# Patient Record
Sex: FEMALE | Race: AMERICAN INDIAN OR ALASKA NATIVE | NOT HISPANIC OR LATINO | Employment: OTHER | URBAN - METROPOLITAN AREA
[De-identification: names, ages, dates, MRNs, and addresses within clinical notes are randomized per-mention and may not be internally consistent; named-entity substitution may affect disease eponyms.]

---

## 2017-03-08 ENCOUNTER — OFFICE VISIT (OUTPATIENT)
Dept: OPTOMETRY | Facility: CLINIC | Age: 27
End: 2017-03-08
Payer: COMMERCIAL

## 2017-03-08 DIAGNOSIS — Z46.0 FITTING AND ADJUSTMENT OF SPECTACLES AND CONTACT LENSES: Primary | ICD-10-CM

## 2017-03-08 DIAGNOSIS — H52.13 HIGH MYOPIA, BILATERAL: Primary | ICD-10-CM

## 2017-03-08 PROCEDURE — 99999 PR PBB SHADOW E&M-EST. PATIENT-LVL I: CPT | Mod: PBBFAC,,, | Performed by: OPTOMETRIST

## 2017-03-08 PROCEDURE — 92015 DETERMINE REFRACTIVE STATE: CPT | Mod: S$GLB,,, | Performed by: OPTOMETRIST

## 2017-03-08 PROCEDURE — 99999 PR PBB SHADOW E&M-EST. PATIENT-LVL II: CPT | Mod: PBBFAC,,, | Performed by: OPTOMETRIST

## 2017-03-08 PROCEDURE — 92004 COMPRE OPH EXAM NEW PT 1/>: CPT | Mod: S$GLB,,, | Performed by: OPTOMETRIST

## 2017-03-08 PROCEDURE — 92310 CONTACT LENS FITTING OU: CPT | Mod: S$GLB,,, | Performed by: OPTOMETRIST

## 2017-03-08 NOTE — PROGRESS NOTES
HPI     ROSE MARY: Dec 2015  Pt states distance va has decreased with CL. Denies f/f    No gtts        Last edited by Daisha Stark on 3/8/2017  7:51 AM.     ROS     Negative for: Constitutional, Gastrointestinal, Neurological, Skin,   Genitourinary, Musculoskeletal, HENT, Endocrine, Cardiovascular, Eyes,   Respiratory, Psychiatric, Allergic/Imm, Heme/Lymph    Last edited by Favio Hernandez, OD on 3/8/2017  8:12 AM. (History)        Assessment /Plan     For exam results, see Encounter Report.    High myopia, bilateral      1. High myopia OS>OD--discussed inc risk of RD, S+S.  Advised yearly DFE  2. Wearing BIOFINITY OU (TORIC OS).  Good fit, but now needs TORIC OU (pt not sure of power has been wearing OS)    PLAN:    1. Sent pt to optical to get TRIALS BIOFINITY TORIC in power she needs.  i do NOT need to see at disp  2. Continue Daily Wear schedule.  NO SLEEPING IN CONTACT LENSES. Clean and disinfect nightly.  exchange monthly.    3. If problems w new guidry will rtc for CLFU for overrefraction, otherwise if no problems will call for CLRx, rtc 1 yr

## 2017-03-27 ENCOUNTER — OFFICE VISIT (OUTPATIENT)
Dept: OBSTETRICS AND GYNECOLOGY | Facility: CLINIC | Age: 27
End: 2017-03-27
Payer: COMMERCIAL

## 2017-03-27 VITALS
BODY MASS INDEX: 53.92 KG/M2 | DIASTOLIC BLOOD PRESSURE: 78 MMHG | SYSTOLIC BLOOD PRESSURE: 126 MMHG | HEIGHT: 62 IN | WEIGHT: 293 LBS

## 2017-03-27 DIAGNOSIS — N92.6 IRREGULAR PERIODS/MENSTRUAL CYCLES: Primary | ICD-10-CM

## 2017-03-27 DIAGNOSIS — R73.02 GLUCOSE INTOLERANCE (IMPAIRED GLUCOSE TOLERANCE): ICD-10-CM

## 2017-03-27 DIAGNOSIS — Z31.69 PROCREATIVE MANAGEMENT COUNSELING: ICD-10-CM

## 2017-03-27 PROCEDURE — 1160F RVW MEDS BY RX/DR IN RCRD: CPT | Mod: S$GLB,,, | Performed by: OBSTETRICS & GYNECOLOGY

## 2017-03-27 PROCEDURE — 99999 PR PBB SHADOW E&M-EST. PATIENT-LVL III: CPT | Mod: PBBFAC,,, | Performed by: OBSTETRICS & GYNECOLOGY

## 2017-03-27 PROCEDURE — 99214 OFFICE O/P EST MOD 30 MIN: CPT | Mod: S$GLB,,, | Performed by: OBSTETRICS & GYNECOLOGY

## 2017-03-27 NOTE — MR AVS SNAPSHOT
"    [facility]  [address]  [place] [address]  Phone: [phone]  Fax: [phone]                  Heidi Zhang Caro   3/27/2017 2:45 PM   Office Visit    Description:  Female : 1990   Provider:  Doni Londono MD   Department:  [facility]           Reason for Visit     New pt.                To Do List           Future Appointments        Provider Department Dept Phone    2017 2:15 PM Doni Londono MD [facility] [phone]      Goals (5 Years of Data)     None      [facility]     [facility] -  Assistance  Registered nurses in the [facility] provide clinical advisement, health education, appointment booking, and other advisory services.  Call for this free service at [phone].             Medications           Message regarding Medications     Verify the changes and/or additions to your medication regime listed below are the same as discussed with your clinician today.  If any of these changes or additions are incorrect, please notify your healthcare provider.        STOP taking these medications     cephALEXin (KEFLEX) 500 MG capsule TK ONE C PO TID           Verify that the below list of medications is an accurate representation of the medications you are currently taking.  If none reported, the list may be blank. If incorrect, please contact your healthcare provider. Carry this list with you in case of emergency.           Current Medications     omeprazole (PRILOSEC) 40 MG capsule Take 1 capsule (40 mg total) by mouth 2 (two) times daily before meals.    sucralfate (CARAFATE) 1 gram tablet Take 1 tablet (1 g total) by mouth 4 (four) times daily.           Clinical Reference Information           Your Vitals Were     BP Height Weight Last Period BMI    126/78 5' 2" (1.575 m) 134 kg (295 lb 6.7 oz) 2017 54.03 kg/m2      Blood Pressure          Most Recent Value    BP  126/78    "   Allergies as of 3/27/2017     No Known Allergies      Immunizations Administered on Date of Encounter - 3/27/2017     None      Language Assistance Services     ATTENTION: Language assistance services are available, free of charge. Please call [phone].      ATENCIÓN: Si carlito anglin, tiene a gann disposición servicios gratuitos de asistencia lingüística. Llame al [phone].     CHÚ Ý: N?u b?n nói Ti?ng Vi?t, có các d?ch v? h? tr? ngôn ng? mi?n phí dành cho b?n. G?i s? [phone].         [facility] complies with applicable Federal civil rights laws and does not discriminate on the basis of race, color, national origin, age, disability, or sex.

## 2017-03-31 RX ORDER — METFORMIN HYDROCHLORIDE 500 MG/1
500 TABLET, EXTENDED RELEASE ORAL NIGHTLY
Qty: 30 TABLET | Refills: 11 | Status: SHIPPED | OUTPATIENT
Start: 2017-03-31 | End: 2017-06-07 | Stop reason: ALTCHOICE

## 2017-03-31 NOTE — PROGRESS NOTES
Subjective:       Patient ID: Heidi Zhang Caro is a 26 y.o. female.    Chief Complaint:  New pt. (Establish care & discuss conception; has long cycles)      History of Present Illness  26-year-old  0 presents with trying to conceive for the past 5 months.  She seen a natural family planning instructor with the [employer].  Was told by her previous doctor that her PCO S labs were normal but her blood sugar was slightly elevated.  Her cycles are currently every 42-45 days.  Previously they had been 35 days.  They last 4-5 days.  On day 1 and 2 they're fairly heavy changing a pad or tampon every 3 hours but days 3 through 5 there's it significantly lighter  Patient does seem to correlate the worsening of her cycles with weight gain.    GYN & OB History  Patient's last menstrual period was 2017.   Date of Last Pap: 11/10/2016    OB History    Para Term  AB SAB TAB Ectopic Multiple Living   0 0 0 0 0 0 0 0 0 0             Review of Systems  Review of Systems   Constitutional: Negative for fatigue and unexpected weight change.   Respiratory: Negative for shortness of breath.    Cardiovascular: Negative for chest pain.   Gastrointestinal: Negative for abdominal pain, constipation, diarrhea, nausea and vomiting.   Genitourinary: Negative for dysuria, hematuria, pelvic pain and vaginal bleeding.   Musculoskeletal: Negative for back pain.   Skin: Negative for rash.        acne   Neurological: Positive for headaches.   Hematological: Does not bruise/bleed easily.   Psychiatric/Behavioral: Negative for behavioral problems.        Objective:   Physical Exam:   Constitutional: She is oriented to person, place, and time. She appears well-developed and well-nourished.    HENT:   Head: Normocephalic and atraumatic.       Pulmonary/Chest: Effort normal.                      Neurological: She is alert and oriented to person, place, and time.    Skin: Skin is warm.    Psychiatric: She has a normal mood  and affect. Her behavior is normal.        Assessment/ Plan:     Orders Placed This Encounter    metformin (GLUCOPHAGE XR) 500 MG 24 hr tablet       Heidi was seen today for new pt..    Diagnoses and all orders for this visit:    Irregular periods/menstrual cycles   We spent a lot of time discussing PCO S and that it cannot always be diagnosed by lab work.  The fact that she's having irregular periods and is overweight both lens that she most likely has PCO S.  We will start her on Glucophage.  Patient does report that her day 3 labs previously were normal.  I encouraged weight loss and referred her to [facility].  As this will help significantly in her fertility journey.  I also told her I wanted to get the lab work that she had previously had done with her doctor so I can evaluate it.   Procreative management counseling    Glucose intolerance (impaired glucose tolerance)  -     metformin (GLUCOPHAGE XR) 500 MG 24 hr tablet; Take 1 tablet (500 mg total) by mouth every evening.   Pros and cons of metformin were discussed.  I do feel like this would help her significantly.  We'll start with 500 mg at night and see how she tolerates it and we may need to go up to 1000.        Return in about 3 months (around 6/27/2017). I encouraged her to have lost 20 pounds by her visit in 3 months.      Health Maintenance       Date Due Completion Date    TETANUS VACCINE 9/10/2007 ---    Influenza Vaccine 8/1/2016 ---    Pap Smear 11/3/2019 11/3/2016

## 2017-05-18 ENCOUNTER — TELEPHONE (OUTPATIENT)
Dept: OPTOMETRY | Facility: CLINIC | Age: 27
End: 2017-05-18

## 2017-05-22 ENCOUNTER — PATIENT MESSAGE (OUTPATIENT)
Dept: OPTOMETRY | Facility: CLINIC | Age: 27
End: 2017-05-22

## 2017-06-07 ENCOUNTER — OFFICE VISIT (OUTPATIENT)
Dept: INTERNAL MEDICINE | Facility: CLINIC | Age: 27
End: 2017-06-07
Payer: COMMERCIAL

## 2017-06-07 VITALS
HEART RATE: 81 BPM | DIASTOLIC BLOOD PRESSURE: 90 MMHG | TEMPERATURE: 98 F | OXYGEN SATURATION: 98 % | SYSTOLIC BLOOD PRESSURE: 131 MMHG | HEIGHT: 62 IN | WEIGHT: 286.38 LBS | BODY MASS INDEX: 52.7 KG/M2

## 2017-06-07 DIAGNOSIS — H65.92 LEFT SEROUS OTITIS MEDIA, UNSPECIFIED CHRONICITY: Primary | ICD-10-CM

## 2017-06-07 PROCEDURE — 99999 PR PBB SHADOW E&M-EST. PATIENT-LVL III: CPT | Mod: PBBFAC,,, | Performed by: NURSE PRACTITIONER

## 2017-06-07 PROCEDURE — 99213 OFFICE O/P EST LOW 20 MIN: CPT | Mod: S$GLB,,, | Performed by: NURSE PRACTITIONER

## 2017-06-07 RX ORDER — FLUTICASONE PROPIONATE 50 MCG
1 SPRAY, SUSPENSION (ML) NASAL DAILY
Qty: 16 G | Refills: 0 | Status: SHIPPED | OUTPATIENT
Start: 2017-06-07 | End: 2018-03-09

## 2017-06-07 NOTE — PROGRESS NOTES
Subjective:       Patient ID: Heidi Zhang Caro is a 26 y.o. female.    Chief Complaint: URI    Ms. Haynes presents today for URI symptoms, including congestion, cough, sore throat, and ear pain. Most of the symptoms have improved, but she is having some residual ear pain and the sore throat has not resolved.       URI    This is a new problem. The current episode started in the past 7 days. The problem has been gradually improving. There has been no fever. Associated symptoms include congestion, coughing, ear pain, headaches and a sore throat. Pertinent negatives include no abdominal pain, diarrhea, dysuria, neck pain, plugged ear sensation, rash, swollen glands or vomiting. She has tried decongestant for the symptoms. The treatment provided significant relief.   Sore Throat    This is a new problem. The current episode started in the past 7 days. The problem has been unchanged. The pain is worse on the left side. There has been no fever. The fever has been present for less than 1 day. The pain is at a severity of 7/10. The pain is mild. Associated symptoms include congestion, coughing, ear pain, headaches and a hoarse voice. Pertinent negatives include no abdominal pain, diarrhea, drooling, ear discharge, plugged ear sensation, neck pain, shortness of breath, stridor, swollen glands, trouble swallowing or vomiting. She has had exposure to strep. She has had no exposure to mono. She has tried NSAIDs for the symptoms. The treatment provided mild relief.     Review of Systems   Constitutional: Negative for fever.   HENT: Positive for congestion, ear pain, hoarse voice and sore throat. Negative for drooling, ear discharge and trouble swallowing.    Eyes: Negative for visual disturbance.   Respiratory: Positive for cough. Negative for shortness of breath and stridor.    Gastrointestinal: Negative for abdominal pain, diarrhea and vomiting.   Genitourinary: Negative for dysuria.   Musculoskeletal: Negative for neck pain.    Skin: Negative for rash.   Neurological: Positive for headaches.   Psychiatric/Behavioral: Negative for confusion.       Objective:      Physical Exam   Constitutional: She is oriented to person, place, and time. She appears well-developed. No distress.   obese   HENT:   Head: Normocephalic and atraumatic.   Right Ear: Tympanic membrane is scarred.   Left Ear: A middle ear effusion is present.   Nose: No mucosal edema, rhinorrhea or sinus tenderness. Right sinus exhibits no maxillary sinus tenderness and no frontal sinus tenderness. Left sinus exhibits no maxillary sinus tenderness and no frontal sinus tenderness.   Mouth/Throat: Posterior oropharyngeal erythema present. No oropharyngeal exudate, posterior oropharyngeal edema or tonsillar abscesses.   Tonsils are surgically absent   Neck: Normal range of motion. Neck supple.   Cardiovascular: Normal rate, regular rhythm and normal heart sounds.  Exam reveals no gallop and no friction rub.    No murmur heard.  Pulmonary/Chest: Effort normal and breath sounds normal. No respiratory distress. She has no wheezes. She has no rales.   Lymphadenopathy:     She has cervical adenopathy.   Neurological: She is alert and oriented to person, place, and time.   Skin: Skin is warm and dry. She is not diaphoretic.   Psychiatric: Her behavior is normal.   Nursing note and vitals reviewed.      Assessment:       1. Left serous otitis media, unspecified chronicity        Plan:   1. Left serous otitis media, unspecified chronicity  - fluticasone (FLONASE) 50 mcg/actuation nasal spray; 1 spray by Each Nare route once daily.  Dispense: 16 g; Refill: 0      Pt has been given instructions populated from "[employer]" database and has verbalized understanding of the after visit summary and information contained wherein.    Follow up with a primary care provider. May go to ER for acute shortness of breath, lightheadedness, fever, or any other emergent complaints or changes in condition.

## 2017-11-03 ENCOUNTER — OFFICE VISIT (OUTPATIENT)
Dept: OPTOMETRY | Facility: CLINIC | Age: 27
End: 2017-11-03
Payer: COMMERCIAL

## 2017-11-03 DIAGNOSIS — H16.9 KERATITIS: Primary | ICD-10-CM

## 2017-11-03 PROCEDURE — 99999 PR PBB SHADOW E&M-EST. PATIENT-LVL II: CPT | Mod: PBBFAC,,, | Performed by: OPTOMETRIST

## 2017-11-03 PROCEDURE — 92012 INTRM OPH EXAM EST PATIENT: CPT | Mod: S$GLB,,, | Performed by: OPTOMETRIST

## 2017-11-03 NOTE — PROGRESS NOTES
JHON BAZZI 03/2017  Contacts felt irritated earlier patient took out rinsed and   reinserted, unsure if lens fell out or is stuck in eye.    Last edited by Demetria Thurman on 11/3/2017  2:49 PM. (History)            Assessment /Plan     For exam results, see Encounter Report.    Keratitis      1. Trace punctate keratitis os, soft lens removed from os, some irritation from trying to remove lens and flush with saline. Start art tears 3-4x/day through weekend and wear glasses. Restart contact s next week only if eye feels normal.

## 2017-12-07 ENCOUNTER — PATIENT MESSAGE (OUTPATIENT)
Dept: GASTROENTEROLOGY | Facility: CLINIC | Age: 27
End: 2017-12-07

## 2017-12-19 ENCOUNTER — PATIENT MESSAGE (OUTPATIENT)
Dept: GASTROENTEROLOGY | Facility: CLINIC | Age: 27
End: 2017-12-19

## 2017-12-19 ENCOUNTER — TELEPHONE (OUTPATIENT)
Dept: GASTROENTEROLOGY | Facility: CLINIC | Age: 27
End: 2017-12-19

## 2018-01-03 DIAGNOSIS — E31.8 POLYGLANDULAR AUTOIMMUNE SYNDROME, TYPE 1: Primary | ICD-10-CM

## 2018-01-03 DIAGNOSIS — E31.8: Primary | ICD-10-CM

## 2018-01-04 ENCOUNTER — PROCEDURE VISIT (OUTPATIENT)
Dept: OBSTETRICS AND GYNECOLOGY | Facility: CLINIC | Age: 28
End: 2018-01-04
Payer: COMMERCIAL

## 2018-01-04 DIAGNOSIS — E31.8 POLYGLANDULAR AUTOIMMUNE SYNDROME, TYPE 1: ICD-10-CM

## 2018-01-04 DIAGNOSIS — E31.8 POLYGLANDULAR AUTOIMMUNE SYNDROME, TYPE 1: Primary | ICD-10-CM

## 2018-01-04 DIAGNOSIS — E31.8: ICD-10-CM

## 2018-01-04 PROCEDURE — 76830 TRANSVAGINAL US NON-OB: CPT | Mod: S$GLB,,, | Performed by: OBSTETRICS & GYNECOLOGY

## 2018-02-18 ENCOUNTER — OFFICE VISIT (OUTPATIENT)
Dept: URGENT CARE | Facility: CLINIC | Age: 28
End: 2018-02-18
Payer: COMMERCIAL

## 2018-02-18 VITALS
RESPIRATION RATE: 18 BRPM | BODY MASS INDEX: 51.53 KG/M2 | HEIGHT: 62 IN | OXYGEN SATURATION: 98 % | SYSTOLIC BLOOD PRESSURE: 127 MMHG | WEIGHT: 280 LBS | TEMPERATURE: 99 F | DIASTOLIC BLOOD PRESSURE: 82 MMHG | HEART RATE: 69 BPM

## 2018-02-18 DIAGNOSIS — T23.262A: Primary | ICD-10-CM

## 2018-02-18 DIAGNOSIS — T23.102A BURN, HAND, FIRST DEGREE, LEFT, INITIAL ENCOUNTER: ICD-10-CM

## 2018-02-18 PROCEDURE — 99214 OFFICE O/P EST MOD 30 MIN: CPT | Mod: S$GLB,,, | Performed by: PHYSICIAN ASSISTANT

## 2018-02-18 PROCEDURE — 3008F BODY MASS INDEX DOCD: CPT | Mod: S$GLB,,, | Performed by: PHYSICIAN ASSISTANT

## 2018-02-18 RX ORDER — SILVER SULFADIAZINE 10 G/1000G
CREAM TOPICAL
Qty: 50 G | Refills: 1 | Status: SHIPPED | OUTPATIENT
Start: 2018-02-18 | End: 2019-02-18

## 2018-02-18 RX ORDER — PROGESTERONE 200 MG/1
300 CAPSULE ORAL
COMMUNITY

## 2018-02-18 RX ORDER — LETROZOLE 2.5 MG/1
2.5 TABLET, FILM COATED ORAL
COMMUNITY
End: 2021-12-27

## 2018-02-18 RX ORDER — HYDROCODONE BITARTRATE AND ACETAMINOPHEN 5; 325 MG/1; MG/1
1 TABLET ORAL EVERY 6 HOURS PRN
Qty: 12 TABLET | Refills: 0 | Status: SHIPPED | OUTPATIENT
Start: 2018-02-18 | End: 2018-02-28

## 2018-02-18 NOTE — LETTER
February 18, 2018      [facility]  [address]  [facility] [address]  Phone: [phone]  Fax: [phone]       Patient: Heidi Mccann Caro   YOB: 1990  Date of Visit: 02/18/2018    To Whom It May Concern:    Justen Haynes  was at [facility] on 02/18/2018. She may return to work/school on 02/22/2018 with no restrictions. If you have any questions or concerns, or if I can be of further assistance, please do not hesitate to contact me.    Sincerely,        Amara Conrad PA-C
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
## 2018-02-18 NOTE — PATIENT INSTRUCTIONS
- Rest.    - Drink plenty of fluids.    - Tylenol or Ibuprofen as directed as needed for fever/pain.    - Ice for the next 24-48 hours.    - Elevate when possible.    - Follow up with your PCP or specialty clinic as directed in the next 1-2 weeks if not improved or as needed.  You can call [phone] to schedule an appointment with the appropriate provider.    - Go to the ED if your symptoms worsen.    Second-Degree Burn  A burn occurs when skin is exposed to too much heat, sun, or harsh chemicals. A second-degree burn (partial-thickness burn) is deeper than a first-degree burn (superficial burn). It usually causes a blister to form. The blister may remain intact and gradually go away on its own. Or it may break open. The goal of treatment is to relieve pain and stop infection while the burn heals.  Home care  Use pain medicine as directed. If no pain medicine was prescribed, you may use over-the-counter medicine to control pain. If you have chronic liver or kidney disease, talk with your healthcare provider before using acetaminophen or ibuprofen. Also talk with your provider if you've had a stomach ulcer or GI bleeding.  General care  · On the first day, you may put a cool compress on the wound to ease pain. A cool compress is a small towel soaked in cool water.  · If you were sent home with the blister intact, don't break the blister. The risk for infection is greater if the blister breaks. If a bandage was applied, change it once a day, unless told otherwise. If the bandage becomes wet or soiled, change it as soon as you can.  · Sometimes an infection may occur even with proper treatment. Check the burn daily for the signs of infection listed below.  · Eat more calories and protein until your wound is healed.  · Wear a hat, sunscreen, and long sleeves while in the sun to protect the skin.  · Don't pick or scratch at the wound. Use over-the-counter medicines like diphenhydramine for itching.  · Avoid  tight-fitting clothes.  To change a bandage:  · Wash your hands.  · Take off the old bandage. If the bandage sticks, soak it off under warm running water.  · Once the bandage is off, gently wash the burn area with mild soap and warm water to remove any cream, ointment, ooze, or scab. You may do this in a sink, under a tub faucet, or in the shower. Rinse off the soap and gently pat dry with a clean towel.  · Check for signs of infection listed below.  · Put any prescribed antibiotic cream or ointment on the wound.  · Cover the burn with nonstick gauze. Then wrap it with the bandage material.  Follow-up care  Follow up with your healthcare provider, or as advised.  When to seek medical advice  Call your healthcare provider right away if you have any of these signs of infection:  · Fever of 100.4°F (38°C) or higher, or as directed by your healthcare provider  · Pain that gets worse  · Redness or swelling that gets worse  · Pus comes from the burn  · Red streaks in your skin coming from the burn  · Wound doesn't appear to be healing  · Nausea or vomiting   Date Last Reviewed: 1/1/2017  © 7532-3786 [employer]. [address] [place] [address], [place] [address]. All rights reserved. This information is not intended as a substitute for professional medical care. Always follow your healthcare professional's instructions.        First-Degree Burn  A burn occurs when skin is exposed to too much heat, sun, or harsh chemicals. A first-degree burn (superficial burn) causes mainly redness. It heals in a few days.  Home care  Follow these guidelines when caring for yourself at home:  · Use pain medicine as directed. You may use over-the-counter medicine to control pain if no pain medicine was prescribed. If you have chronic liver or kidney disease, talk with your healthcare provider before using acetaminophen or ibuprofen. Also talk with your provider if you've had a stomach ulcer or GI bleeding.  · On the first day, you may  put a cool compress on the burn to relieve pain. You can use a small towel soaked in cool water as a cool compress.  · Numbing medicines for sunburn can be used for temporary relief of the burning feeling. These medicines include lidocaine or benzocaine. You dont need a prescription for them.  · Moisturizers with aloe vera can help soothe the burn.  · Don't pick or scratch at the affected areas. Use over-the-counter medicines like diphenhydramine for itching. This medicine is taken by mouth.  · Since you don't have an open wound, you don't need to use antibiotic cream or ointment. Sometimes an infection may occur even with proper treatment. Be sure to check the burn daily for the signs of infection listed below.  · Wear a hat, sunscreen, and long sleeves while in the sun.  · Wear loose-fitting clothing until the burn heals.  Follow-up care  Follow up with your healthcare provider, or as advised. Most first-degree burns heal well without complications.  When to seek medical advice  Call your healthcare provider right away if any of these signs of infection occur:  · Fever of 100.4°F (38°C) or higher, or as directed by your healthcare provider  · Pain gets worse  · Redness or swelling gets worse  · Pus comes from the burn  · Red streaks in your skin come from the burn  · Wound doesn't appear to be healing  Date Last Reviewed: 12/1/2016  © 0984-9732 [employer]. [address] [place] [address], [place] [address]. All rights reserved. This information is not intended as a substitute for professional medical care. Always follow your healthcare professional's instructions.

## 2018-03-09 ENCOUNTER — OFFICE VISIT (OUTPATIENT)
Dept: GASTROENTEROLOGY | Facility: CLINIC | Age: 28
End: 2018-03-09
Payer: COMMERCIAL

## 2018-03-09 VITALS
WEIGHT: 283.06 LBS | BODY MASS INDEX: 51.77 KG/M2 | DIASTOLIC BLOOD PRESSURE: 88 MMHG | HEART RATE: 78 BPM | SYSTOLIC BLOOD PRESSURE: 134 MMHG

## 2018-03-09 DIAGNOSIS — R10.13 EPIGASTRIC ABDOMINAL PAIN: Primary | ICD-10-CM

## 2018-03-09 DIAGNOSIS — R11.0 NAUSEA: ICD-10-CM

## 2018-03-09 PROCEDURE — 99999 PR PBB SHADOW E&M-EST. PATIENT-LVL II: CPT | Mod: PBBFAC,,, | Performed by: INTERNAL MEDICINE

## 2018-03-09 PROCEDURE — 99213 OFFICE O/P EST LOW 20 MIN: CPT | Mod: S$GLB,,, | Performed by: INTERNAL MEDICINE

## 2018-03-09 RX ORDER — OMEPRAZOLE 40 MG/1
40 CAPSULE, DELAYED RELEASE ORAL EVERY MORNING
Qty: 30 CAPSULE | Refills: 11 | Status: SHIPPED | OUTPATIENT
Start: 2018-03-09 | End: 2020-10-21 | Stop reason: SDUPTHER

## 2018-03-19 NOTE — PROGRESS NOTES
Subjective:       Patient ID: Heidi Zhang Caro is a 27 y.o. female.    Chief Complaint: Gastroesophageal Reflux    This is a 27-year-old female who presents for a f/u regarding epigastric pain. She initially described epigastric pain occurring for weeks, daily associated with belching on nausea. She was taking nsaids at the time. She began avoiding them and losing weight intentionally. Omeprazole resulted in significant benefit, now she takes as needed which is several times weekly. No vomiting. Otherwise she is feeling well.     The following portions of the patient's history were reviewed and updated as appropriate: allergies, current medications, past family history, past medical history, past social history, past surgical history and problem list.    (Portions of this note were dictated using voice recognition software and may contain dictation related errors in spelling/grammar/syntax not found on text review)    HPI  Review of Systems   Respiratory: Negative for apnea and chest tightness.    Gastrointestinal: Negative for abdominal distention and abdominal pain.       Objective:      Physical Exam   Constitutional: She is oriented to person, place, and time. She appears well-developed and well-nourished. No distress.   HENT:   Head: Normocephalic and atraumatic.   Eyes: Conjunctivae are normal. Pupils are equal, round, and reactive to light. No scleral icterus.   Neck: Normal range of motion. Neck supple.   Pulmonary/Chest: Effort normal. No respiratory distress.   Abdominal: Soft. Bowel sounds are normal. She exhibits no distension. There is no tenderness.   Neurological: She is alert and oriented to person, place, and time.   Skin: She is not diaphoretic.   Psychiatric: She has a normal mood and affect. Her behavior is normal.   Nursing note and vitals reviewed.      Assessment:       1. Epigastric abdominal pain    2. Nausea        Plan:   1. Discussed weight loss etc  2. Continue PPI, benefits outweigh  risks in my opinion for her. Can also take as needed. Hopefully we'll be able to wean off as she continues to lose weight

## 2018-07-02 ENCOUNTER — OFFICE VISIT (OUTPATIENT)
Dept: INTERNAL MEDICINE | Facility: CLINIC | Age: 28
End: 2018-07-02
Payer: COMMERCIAL

## 2018-07-02 VITALS
HEIGHT: 62 IN | OXYGEN SATURATION: 99 % | TEMPERATURE: 99 F | DIASTOLIC BLOOD PRESSURE: 72 MMHG | SYSTOLIC BLOOD PRESSURE: 126 MMHG | WEIGHT: 281.31 LBS | HEART RATE: 97 BPM | BODY MASS INDEX: 51.77 KG/M2

## 2018-07-02 DIAGNOSIS — B34.9 ACUTE VIRAL SYNDROME: Primary | ICD-10-CM

## 2018-07-02 PROCEDURE — 99999 PR PBB SHADOW E&M-EST. PATIENT-LVL IV: CPT | Mod: PBBFAC,,, | Performed by: NURSE PRACTITIONER

## 2018-07-02 PROCEDURE — 3008F BODY MASS INDEX DOCD: CPT | Mod: CPTII,S$GLB,, | Performed by: NURSE PRACTITIONER

## 2018-07-02 PROCEDURE — 99213 OFFICE O/P EST LOW 20 MIN: CPT | Mod: S$GLB,,, | Performed by: NURSE PRACTITIONER

## 2018-07-02 RX ORDER — FLUTICASONE PROPIONATE 50 MCG
1 SPRAY, SUSPENSION (ML) NASAL DAILY
Qty: 16 G | Refills: 0 | Status: ON HOLD | OUTPATIENT
Start: 2018-07-02 | End: 2022-03-04

## 2018-07-02 RX ORDER — PROMETHAZINE HYDROCHLORIDE AND DEXTROMETHORPHAN HYDROBROMIDE 6.25; 15 MG/5ML; MG/5ML
5 SYRUP ORAL NIGHTLY PRN
Qty: 120 ML | Refills: 0 | Status: SHIPPED | OUTPATIENT
Start: 2018-07-02 | End: 2018-07-12

## 2018-07-02 RX ORDER — ESTRADIOL 0.1 MG/D
FILM, EXTENDED RELEASE TRANSDERMAL
Refills: 3 | COMMUNITY
Start: 2018-06-14 | End: 2021-06-30

## 2018-07-02 RX ORDER — BENZONATATE 200 MG/1
200 CAPSULE ORAL 2 TIMES DAILY PRN
Qty: 30 CAPSULE | Refills: 0 | Status: SHIPPED | OUTPATIENT
Start: 2018-07-02 | End: 2019-07-18 | Stop reason: ALTCHOICE

## 2018-07-02 NOTE — MEDICAL/APP STUDENT
Subjective:       Patient ID: Heidi Zhang Caro is a 27 y.o. female.    Chief Complaint: Cough (post nasal drip, fatigue)    Cough   This is a new problem. The current episode started 1 to 4 weeks ago. The problem has been unchanged. The problem occurs constantly. Associated symptoms include ear pain (occasionally), postnasal drip, rhinorrhea and a sore throat. Pertinent negatives include no chest pain, chills, eye redness, fever, myalgias, shortness of breath or wheezing. Treatments tried: claritin D and Robitussin.     Review of Systems   Constitutional: Negative for chills and fever.   HENT: Positive for congestion, ear pain (occasionally), postnasal drip, rhinorrhea, sore throat and voice change. Negative for sneezing.    Eyes: Negative for pain, discharge, redness, itching and visual disturbance.   Respiratory: Positive for cough. Negative for shortness of breath and wheezing.    Cardiovascular: Negative for chest pain, palpitations and leg swelling.   Gastrointestinal: Positive for nausea. Negative for constipation, diarrhea and vomiting.   Endocrine: Negative for polydipsia, polyphagia and polyuria.   Genitourinary: Negative for dysuria, frequency, hematuria, urgency, vaginal bleeding, vaginal discharge and vaginal pain.   Musculoskeletal: Negative for arthralgias and myalgias.   Skin: Negative for wound.   Neurological: Positive for dizziness. Negative for weakness and light-headedness.   Hematological: Negative for adenopathy. Does not bruise/bleed easily.       Objective:      Physical Exam   Constitutional: She is oriented to person, place, and time. She appears well-developed and well-nourished. No distress.   HENT:   Head: Normocephalic and atraumatic.   Right Ear: External ear normal.   Left Ear: External ear normal.   Nose: Nose normal.   Mouth/Throat: Oropharynx is clear and moist.   Eyes: Conjunctivae and EOM are normal. Pupils are equal, round, and reactive to light. Right eye exhibits no discharge.  Left eye exhibits no discharge. No scleral icterus.   Neck: Normal range of motion.   Cardiovascular: Normal rate, regular rhythm and normal heart sounds.  Exam reveals no gallop and no friction rub.    No murmur heard.  Pulmonary/Chest: Effort normal and breath sounds normal. No respiratory distress. She has no wheezes. She has no rales. She exhibits no tenderness.   Abdominal: Soft. Bowel sounds are normal. She exhibits no distension.   Musculoskeletal: Normal range of motion. She exhibits no edema, tenderness or deformity.   Neurological: She is alert and oriented to person, place, and time.   Skin: Skin is warm and dry. Capillary refill takes less than 2 seconds. She is not diaphoretic.   Psychiatric: She has a normal mood and affect.   Nursing note and vitals reviewed.      Assessment:       No diagnosis found.    Plan:

## 2018-07-02 NOTE — PROGRESS NOTES
I attest that this patient was seen and evaluated by Warren Graham, MAGDALENA, FNP-S, then presented to me. I then examined the patient independently and together we agreed on a diagnosis and treatment plan which was then presented to the patient. See his note dated today for full visit information.    Subjective:       Patient ID: Heidi Zhang Caro is a 27 y.o. female.     Chief Complaint: Cough (post nasal drip, fatigue)     Cough   This is a new problem. The current episode started 1 to 4 weeks ago. The problem has been unchanged. The problem occurs constantly. Associated symptoms include ear pain (occasionally), postnasal drip, rhinorrhea and a sore throat. Pertinent negatives include no chest pain, chills, eye redness, fever, myalgias, shortness of breath or wheezing. Treatments tried: claritin D and Robitussin.      Review of Systems   Constitutional: Negative for chills and fever.   HENT: Positive for congestion, ear pain (occasionally), postnasal drip, rhinorrhea, sore throat and voice change. Negative for sneezing.    Eyes: Negative for pain, discharge, redness, itching and visual disturbance.   Respiratory: Positive for cough. Negative for shortness of breath and wheezing.    Cardiovascular: Negative for chest pain, palpitations and leg swelling.   Gastrointestinal: Positive for nausea. Negative for constipation, diarrhea and vomiting.   Endocrine: Negative for polydipsia, polyphagia and polyuria.   Genitourinary: Negative for dysuria, frequency, hematuria, urgency, vaginal bleeding, vaginal discharge and vaginal pain.   Musculoskeletal: Negative for arthralgias and myalgias.   Skin: Negative for wound.   Neurological: Positive for dizziness. Negative for weakness and light-headedness.   Hematological: Negative for adenopathy. Does not bruise/bleed easily.       Objective:   Physical Exam   Constitutional: She is oriented to person, place, and time. She appears well-developed and well-nourished. No distress.    HENT:   Head: Normocephalic and atraumatic.   Right Ear: External ear normal.   Left Ear: External ear normal.   Nose: Nose normal.   Mouth/Throat: Oropharynx is clear and moist.   Eyes: Conjunctivae and EOM are normal. Pupils are equal, round, and reactive to light. Right eye exhibits no discharge. Left eye exhibits no discharge. No scleral icterus.   Neck: Normal range of motion.   Cardiovascular: Normal rate, regular rhythm and normal heart sounds.  Exam reveals no gallop and no friction rub.    No murmur heard.  Pulmonary/Chest: Effort normal and breath sounds normal. No respiratory distress. She has no wheezes. She has no rales. She exhibits no tenderness.   Abdominal: Soft. Bowel sounds are normal. She exhibits no distension.   Musculoskeletal: Normal range of motion. She exhibits no edema, tenderness or deformity.   Neurological: She is alert and oriented to person, place, and time.   Skin: Skin is warm and dry. Capillary refill takes less than 2 seconds. She is not diaphoretic.   Psychiatric: She has a normal mood and affect.   Nursing note and vitals reviewed.      Assessment:       1. Acute viral syndrome        Plan:     Heidi was seen today for cough.    Diagnoses and all orders for this visit:    Acute viral syndrome  -     fluticasone (FLONASE) 50 mcg/actuation nasal spray; 1 spray (50 mcg total) by Each Nare route once daily.  -     benzonatate (TESSALON) 200 MG capsule; Take 1 capsule (200 mg total) by mouth 2 (two) times daily as needed.  -     promethazine-dextromethorphan (PROMETHAZINE-DM) 6.25-15 mg/5 mL Syrp; Take 5 mLs by mouth nightly as needed.

## 2018-07-11 NOTE — TELEPHONE ENCOUNTER
----- Message from Carolyne Troy sent at 5/18/2017  2:17 PM CDT -----  Contact: Pt  Pt would like to speak to Dr. Hernandez or the nurse about her cl, she can be reached at [phone]   Calm/Appropriate

## 2019-07-18 ENCOUNTER — OFFICE VISIT (OUTPATIENT)
Dept: URGENT CARE | Facility: CLINIC | Age: 29
End: 2019-07-18
Payer: COMMERCIAL

## 2019-07-18 VITALS
OXYGEN SATURATION: 97 % | WEIGHT: 275 LBS | SYSTOLIC BLOOD PRESSURE: 128 MMHG | HEIGHT: 62 IN | RESPIRATION RATE: 16 BRPM | DIASTOLIC BLOOD PRESSURE: 90 MMHG | HEART RATE: 79 BPM | BODY MASS INDEX: 50.61 KG/M2 | TEMPERATURE: 99 F

## 2019-07-18 DIAGNOSIS — M54.9 BACK PAIN, UNSPECIFIED BACK LOCATION, UNSPECIFIED BACK PAIN LATERALITY, UNSPECIFIED CHRONICITY: Primary | ICD-10-CM

## 2019-07-18 LAB
BILIRUB UR QL STRIP: NEGATIVE
GLUCOSE UR QL STRIP: NEGATIVE
KETONES UR QL STRIP: NEGATIVE
LEUKOCYTE ESTERASE UR QL STRIP: NEGATIVE
PH, POC UA: 6.5 (ref 5–8)
POC BLOOD, URINE: NEGATIVE
POC NITRATES, URINE: NEGATIVE
PROT UR QL STRIP: NEGATIVE
SP GR UR STRIP: 1.02 (ref 1–1.03)
UROBILINOGEN UR STRIP-ACNC: NORMAL (ref 0.1–1.1)

## 2019-07-18 PROCEDURE — 99214 PR OFFICE/OUTPT VISIT, EST, LEVL IV, 30-39 MIN: ICD-10-PCS | Mod: S$GLB,,, | Performed by: NURSE PRACTITIONER

## 2019-07-18 PROCEDURE — 81003 POCT URINALYSIS, DIPSTICK, AUTOMATED, W/O SCOPE: ICD-10-PCS | Mod: QW,S$GLB,, | Performed by: NURSE PRACTITIONER

## 2019-07-18 PROCEDURE — 81003 URINALYSIS AUTO W/O SCOPE: CPT | Mod: QW,S$GLB,, | Performed by: NURSE PRACTITIONER

## 2019-07-18 PROCEDURE — 99214 OFFICE O/P EST MOD 30 MIN: CPT | Mod: S$GLB,,, | Performed by: NURSE PRACTITIONER

## 2019-07-18 PROCEDURE — 3008F PR BODY MASS INDEX (BMI) DOCUMENTED: ICD-10-PCS | Mod: CPTII,S$GLB,, | Performed by: NURSE PRACTITIONER

## 2019-07-18 PROCEDURE — 3008F BODY MASS INDEX DOCD: CPT | Mod: CPTII,S$GLB,, | Performed by: NURSE PRACTITIONER

## 2019-07-18 RX ORDER — METHOCARBAMOL 750 MG/1
750 TABLET, FILM COATED ORAL 4 TIMES DAILY
Qty: 28 TABLET | Refills: 0 | Status: SHIPPED | OUTPATIENT
Start: 2019-07-18 | End: 2019-07-25

## 2019-07-18 RX ORDER — ETODOLAC 300 MG/1
300 CAPSULE ORAL 3 TIMES DAILY
Qty: 21 CAPSULE | Refills: 0 | Status: SHIPPED | OUTPATIENT
Start: 2019-07-18 | End: 2019-07-25

## 2019-07-18 NOTE — PROGRESS NOTES
"Subjective:       Patient ID: Heidi Zhang Caro is a 28 y.o. female.    Vitals:  height is 5' 2" (1.575 m) and weight is 124.7 kg (275 lb). Her oral temperature is 98.7 °F (37.1 °C). Her blood pressure is 128/90 (abnormal) and her pulse is 79. Her respiration is 16 and oxygen saturation is 97%.     Chief Complaint: Back Pain    Back Pain   This is a new problem. The current episode started today. The problem occurs rarely. The problem has been gradually worsening since onset. The quality of the pain is described as cramping. Radiates to: right buttock, abdomen. The pain is at a severity of 7/10. The pain is moderate. The pain is the same all the time. The symptoms are aggravated by position. Associated symptoms include abdominal pain. Pertinent negatives include no bladder incontinence, bowel incontinence, dysuria or numbness. She has tried NSAIDs for the symptoms. The treatment provided no relief.       Constitution: Negative for fatigue.   Gastrointestinal: Positive for abdominal pain. Negative for bowel incontinence.   Genitourinary: Negative for dysuria, urgency, bladder incontinence and hematuria.   Musculoskeletal: Positive for back pain. Negative for muscle cramps and history of spine disorder.   Skin: Negative for rash.   Neurological: Negative for coordination disturbances, numbness and tingling.       Objective:      Physical Exam   Constitutional: She is oriented to person, place, and time. Vital signs are normal. She appears well-developed and well-nourished. She is active and cooperative.  Non-toxic appearance. She does not have a sickly appearance. She does not appear ill. No distress.   HENT:   Head: Normocephalic and atraumatic.   Nose: Nose normal.   Mouth/Throat: Oropharynx is clear and moist and mucous membranes are normal.   Eyes: Pupils are equal, round, and reactive to light. Conjunctivae, EOM and lids are normal.   Neck: Trachea normal, normal range of motion, full passive range of motion " without pain and phonation normal. Neck supple.   Cardiovascular: Normal rate, regular rhythm, normal heart sounds, intact distal pulses and normal pulses.   Pulses:       Radial pulses are 2+ on the right side, and 2+ on the left side.   Pulmonary/Chest: Effort normal and breath sounds normal.   Abdominal: Soft. Normal appearance and bowel sounds are normal. She exhibits no abdominal bruit, no pulsatile midline mass and no mass. There is no rigidity, no rebound, no guarding, no CVA tenderness, no tenderness at McBurney's point and negative Rodriguez's sign.   Musculoskeletal: She exhibits no edema or deformity.        Lumbar back: She exhibits pain and spasm. She exhibits normal range of motion, no tenderness, no bony tenderness, no swelling, no edema, no deformity, no laceration and normal pulse.        Back:    Neurological: She is alert and oriented to person, place, and time. She has normal strength and normal reflexes. No sensory deficit.   Skin: Skin is warm, dry and intact. Capillary refill takes less than 2 seconds. No rash noted. She is not diaphoretic.   Psychiatric: She has a normal mood and affect. Her speech is normal and behavior is normal. Judgment and thought content normal. Cognition and memory are normal.   Nursing note and vitals reviewed.      Assessment:       1. Back pain, unspecified back location, unspecified back pain laterality, unspecified chronicity        Results for orders placed or performed in visit on 07/18/19   POCT Urinalysis, Dipstick, Automated, W/O Scope   Result Value Ref Range    POC Blood, Urine Negative Negative    POC Bilirubin, Urine Negative Negative    POC Urobilinogen, Urine norm 0.1 - 1.1    POC Ketones, Urine Negative Negative    POC Protein, Urine Negative Negative    POC Nitrates, Urine Negative Negative    POC Glucose, Urine Negative Negative    pH, UA 6.5 5 - 8    POC Specific Gravity, Urine 1.020 1.003 - 1.029    POC Leukocytes, Urine Negative Negative       Plan:        Presents to the Urgent Care for back pain.  Denies fever, rash, night sweats, weight loss, dysuria, bowel/bladder incontinence, or IV\SQ drug use. The patient is a non-toxic,afebrile, and well appearing female.  Patient noticed right lower back pain upon awakening this morning. Given the above findings, my overall impression is Musculoskeletal Pain. Given the above findings, I do not think the patient has acute vertebral fracture, subluxation, dislocation, sciatica, epidural abscess, cauda equina, shingles, UTI.  Vital signs reassuring.     Back pain, unspecified back location, unspecified back pain laterality, unspecified chronicity  -     POCT Urinalysis, Dipstick, Automated, W/O Scope  -     methocarbamol (ROBAXIN) 750 MG Tab; Take 1 tablet (750 mg total) by mouth 4 (four) times daily. for 7 days  Dispense: 28 tablet; Refill: 0  -     etodolac (LODINE) 300 MG Cap; Take 1 capsule (300 mg total) by mouth 3 (three) times daily. for 7 days  Dispense: 21 capsule; Refill: 0      Patient Instructions     If you were prescribed a narcotic or controlled medication, do not drive or operate heavy equipment or machinery while taking these medications.  You must understand that you've received an Urgent Care treatment only and that you may be released before all your medical problems are known or treated. You, the patient, will arrange for follow up care as instructed.  Follow up with your PCP or specialty clinic as directed within 2-5 days if not improved or as needed.  You can call [phone] to schedule an appointment with the appropriate provider.  If your condition worsens we recommend that you receive another evaluation at the emergency room immediately or contact your primary medical clinics after hours call service to discuss your concerns.  Please return here or go to the Emergency Department for any concerns or worsening of condition.      Back Pain (Acute or Chronic)    Back pain is one of the most common  problems. The good news is that most people feel better in 1 to 2 weeks, and most of the rest in 1 to 2 months. Most people can remain active.  People experience and describe pain differently; not everyone is the same.  · The pain can be sharp, stabbing, shooting, aching, cramping or burning.  · Movement, standing, bending, lifting, sitting, or walking may worsen pain.  · It can be localized to one spot or area, or it can be more generalized.  · It can spread or radiate upwards, to the front, or go down your arms or legs (sciatica).  · It can cause muscle spasm.  Most of the time, mechanical problems with the muscles or spine cause the pain. Mechanical problems are usually caused by an injury to the muscles or ligaments. While illness can cause back pain, it is usually not caused by a serious illness. Mechanical problems include:   · Physical activity such as sports, exercise, work, or normal activity  · Overexertion, lifting, pushing, pulling incorrectly or too aggressively  · Sudden twisting, bending, or stretching from an accident, or accidental movement  · Poor posture  · Stretching or moving wrong, without noticing pain at the time  · Poor coordination, lack of regular exercise (check with your doctor about this)  · Spinal disc disease or arthritis  · Stress  Pain can also be related to pregnancy, or illness like appendicitis, bladder or kidney infections, pelvic infections, and many other things.  Acute back pain usually gets better in 1 to 2 weeks. Back pain related to disk disease, arthritis in the spinal joints or spinal stenosis (narrowing of the spinal canal) can become chronic and last for months or years.  Unless you had a physical injury (for example, a car accident or fall) X-rays are usually not needed for the initial evaluation of back pain. If pain continues and does not respond to medical treatment, X-rays and other tests may be needed.  Home care  Try these home care recommendations:  · When in  bed, try to find a position of comfort. A firm mattress is best. Try lying flat on your back with pillows under your knees. You can also try lying on your side with your knees bent up towards your chest and a pillow between your knees.  · At first, do not try to stretch out the sore spots. If there is a strain, it is not like the good soreness you get after exercising without an injury. In this case, stretching may make it worse.  · Avoid prolong sitting, long car rides, or travel. This puts more stress on the lower back than standing or walking.  · During the first 24 to 72 hours after an acute injury or flare up of chronic back pain, apply an ice pack to the painful area for 20 minutes and then remove it for 20 minutes. Do this over a period of 60 to 90 minutes or several times a day. This will reduce swelling and pain. Wrap the ice pack in a thin towel or plastic to protect your skin.  · You can start with ice, then switch to heat. Heat (hot shower, hot bath, or heating pad) reduces pain and works well for muscle spasms. Heat can be applied to the painful area for 20 minutes then remove it for 20 minutes. Do this over a period of 60 to 90 minutes or several times a day. Do not sleep on a heating pad. It can lead to skin burns or tissue damage.  · You can alternate ice and heat therapy. Talk with your doctor about the best treatment for your back pain.  · Therapeutic massage can help relax the back muscles without stretching them.  · Be aware of safe lifting methods and do not lift anything without stretching first.  Medicines  Talk to your doctor before using medicine, especially if you have other medical problems or are taking other medicines.  · You may use over-the-counter medicine as directed on the bottle to control pain, unless another pain medicine was prescribed. If you have chronic conditions like diabetes, liver or kidney disease, stomach ulcers, or gastrointestinal bleeding, or are taking blood  thinners, talk to your doctor before taking any medicine.  · Be careful if you are given a prescription medicines, narcotics, or medicine for muscle spasms. They can cause drowsiness, affect your coordination, reflexes, and judgement. Do not drive or operate heavy machinery.  Follow-up care  Follow up with your healthcare provider, or as advised.   A radiologist will review any X-rays that were taken. Your provide will notify you of any new findings that may affect your care.  Call 911  Call emergency services if any of the following occur:  · Trouble breathing  · Confusion  · Very drowsy or trouble awakening  · Fainting or loss of consciousness  · Rapid or very slow heart rate  · Loss of bowel or bladder control  When to seek medical advice  Call your healthcare provider right away if any of these occur:   · Pain becomes worse or spreads to your legs  · Weakness or numbness in one or both legs  · Numbness in the groin or genital area  Date Last Reviewed: 7/1/2016  © 1310-8600 [employer]. [address] [place] [address], [place] [address]. All rights reserved. This information is not intended as a substitute for professional medical care. Always follow your healthcare professional's instructions.        Self-Care for Low Back Pain    Most people have low back pain now and then. In many cases, it isnt serious and self-care can help. Sometimes low back pain can be a sign of a bigger problem. Call your healthcare provider if your pain returns often or gets worse over time. For the long-term care of your back, get regular exercise, lose any excess weight and learn good posture.  Take a short rest  Lying down during the day may be beneficial for short periods of time if severe pain increases with sitting or standing. Long-term bed rest could be detrimental.  Reduce pain and swelling  Cold reduces swelling. Both cold and heat can reduce pain. Protect your skin by placing a towel between your body and the ice or  heat source.  · For the first few days, apply an ice pack for 15 to 20 minutes .  · After the first few days, try heat for 15 minutes at a time to ease pain. Never sleep on a heating pad.  · Over-the-counter medicine can help control pain and swelling. Try aspirin or ibuprofen.  Exercise  Exercise can help your back heal. It also helps your back get stronger and more flexible, preventing any reinjury. Ask your healthcare provider about specific exercises for your back.  Use good posture to avoid reinjury  · When moving, bend at the hips and knees. Dont bend at the waist or twist around.  · When lifting, keep the object close to your body. Dont try to lift more than you can handle.  · When sitting, keep your lower back supported. Use a rolled-up towel as needed.  Seek immediate medical care if:  · Youre unable to stand or walk.  · You have a temperature over 100.4°F (38.0°C)  · You have frequent, painful, or bloody urination.  · You have severe abdominal pain.  · You have a sharp, stabbing pain.  · Your pain is constant.  · You have pain or numbness in your leg.  · You feel pain in a new area of your back.  · You notice that the pain isnt decreasing after more than a week.   Date Last Reviewed: 9/29/2015  © 2326-2755 [employer]. [address] [place] [address], [place] [address]. All rights reserved. This information is not intended as a substitute for professional medical care. Always follow your healthcare professional's instructions.

## 2019-07-18 NOTE — PATIENT INSTRUCTIONS
If you were prescribed a narcotic or controlled medication, do not drive or operate heavy equipment or machinery while taking these medications.  You must understand that you've received an Urgent Care treatment only and that you may be released before all your medical problems are known or treated. You, the patient, will arrange for follow up care as instructed.  Follow up with your PCP or specialty clinic as directed within 2-5 days if not improved or as needed.  You can call [phone] to schedule an appointment with the appropriate provider.  If your condition worsens we recommend that you receive another evaluation at the emergency room immediately or contact your primary medical clinics after hours call service to discuss your concerns.  Please return here or go to the Emergency Department for any concerns or worsening of condition.      Back Pain (Acute or Chronic)    Back pain is one of the most common problems. The good news is that most people feel better in 1 to 2 weeks, and most of the rest in 1 to 2 months. Most people can remain active.  People experience and describe pain differently; not everyone is the same.  · The pain can be sharp, stabbing, shooting, aching, cramping or burning.  · Movement, standing, bending, lifting, sitting, or walking may worsen pain.  · It can be localized to one spot or area, or it can be more generalized.  · It can spread or radiate upwards, to the front, or go down your arms or legs (sciatica).  · It can cause muscle spasm.  Most of the time, mechanical problems with the muscles or spine cause the pain. Mechanical problems are usually caused by an injury to the muscles or ligaments. While illness can cause back pain, it is usually not caused by a serious illness. Mechanical problems include:   · Physical activity such as sports, exercise, work, or normal activity  · Overexertion, lifting, pushing, pulling incorrectly or too aggressively  · Sudden twisting, bending, or  stretching from an accident, or accidental movement  · Poor posture  · Stretching or moving wrong, without noticing pain at the time  · Poor coordination, lack of regular exercise (check with your doctor about this)  · Spinal disc disease or arthritis  · Stress  Pain can also be related to pregnancy, or illness like appendicitis, bladder or kidney infections, pelvic infections, and many other things.  Acute back pain usually gets better in 1 to 2 weeks. Back pain related to disk disease, arthritis in the spinal joints or spinal stenosis (narrowing of the spinal canal) can become chronic and last for months or years.  Unless you had a physical injury (for example, a car accident or fall) X-rays are usually not needed for the initial evaluation of back pain. If pain continues and does not respond to medical treatment, X-rays and other tests may be needed.  Home care  Try these home care recommendations:  · When in bed, try to find a position of comfort. A firm mattress is best. Try lying flat on your back with pillows under your knees. You can also try lying on your side with your knees bent up towards your chest and a pillow between your knees.  · At first, do not try to stretch out the sore spots. If there is a strain, it is not like the good soreness you get after exercising without an injury. In this case, stretching may make it worse.  · Avoid prolong sitting, long car rides, or travel. This puts more stress on the lower back than standing or walking.  · During the first 24 to 72 hours after an acute injury or flare up of chronic back pain, apply an ice pack to the painful area for 20 minutes and then remove it for 20 minutes. Do this over a period of 60 to 90 minutes or several times a day. This will reduce swelling and pain. Wrap the ice pack in a thin towel or plastic to protect your skin.  · You can start with ice, then switch to heat. Heat (hot shower, hot bath, or heating pad) reduces pain and works well  for muscle spasms. Heat can be applied to the painful area for 20 minutes then remove it for 20 minutes. Do this over a period of 60 to 90 minutes or several times a day. Do not sleep on a heating pad. It can lead to skin burns or tissue damage.  · You can alternate ice and heat therapy. Talk with your doctor about the best treatment for your back pain.  · Therapeutic massage can help relax the back muscles without stretching them.  · Be aware of safe lifting methods and do not lift anything without stretching first.  Medicines  Talk to your doctor before using medicine, especially if you have other medical problems or are taking other medicines.  · You may use over-the-counter medicine as directed on the bottle to control pain, unless another pain medicine was prescribed. If you have chronic conditions like diabetes, liver or kidney disease, stomach ulcers, or gastrointestinal bleeding, or are taking blood thinners, talk to your doctor before taking any medicine.  · Be careful if you are given a prescription medicines, narcotics, or medicine for muscle spasms. They can cause drowsiness, affect your coordination, reflexes, and judgement. Do not drive or operate heavy machinery.  Follow-up care  Follow up with your healthcare provider, or as advised.   A radiologist will review any X-rays that were taken. Your provide will notify you of any new findings that may affect your care.  Call 911  Call emergency services if any of the following occur:  · Trouble breathing  · Confusion  · Very drowsy or trouble awakening  · Fainting or loss of consciousness  · Rapid or very slow heart rate  · Loss of bowel or bladder control  When to seek medical advice  Call your healthcare provider right away if any of these occur:   · Pain becomes worse or spreads to your legs  · Weakness or numbness in one or both legs  · Numbness in the groin or genital area  Date Last Reviewed: 7/1/2016  © 9278-1154 [employer] [address]  [place] [address]. All rights reserved. This information is not intended as a substitute for professional medical care. Always follow your healthcare professional's instructions.        Self-Care for Low Back Pain    Most people have low back pain now and then. In many cases, it isnt serious and self-care can help. Sometimes low back pain can be a sign of a bigger problem. Call your healthcare provider if your pain returns often or gets worse over time. For the long-term care of your back, get regular exercise, lose any excess weight and learn good posture.  Take a short rest  Lying down during the day may be beneficial for short periods of time if severe pain increases with sitting or standing. Long-term bed rest could be detrimental.  Reduce pain and swelling  Cold reduces swelling. Both cold and heat can reduce pain. Protect your skin by placing a towel between your body and the ice or heat source.  · For the first few days, apply an ice pack for 15 to 20 minutes .  · After the first few days, try heat for 15 minutes at a time to ease pain. Never sleep on a heating pad.  · Over-the-counter medicine can help control pain and swelling. Try aspirin or ibuprofen.  Exercise  Exercise can help your back heal. It also helps your back get stronger and more flexible, preventing any reinjury. Ask your healthcare provider about specific exercises for your back.  Use good posture to avoid reinjury  · When moving, bend at the hips and knees. Dont bend at the waist or twist around.  · When lifting, keep the object close to your body. Dont try to lift more than you can handle.  · When sitting, keep your lower back supported. Use a rolled-up towel as needed.  Seek immediate medical care if:  · Youre unable to stand or walk.  · You have a temperature over 100.4°F (38.0°C)  · You have frequent, painful, or bloody urination.  · You have severe abdominal pain.  · You have a sharp, stabbing pain.  · Your pain  is constant.  · You have pain or numbness in your leg.  · You feel pain in a new area of your back.  · You notice that the pain isnt decreasing after more than a week.   Date Last Reviewed: 9/29/2015  © 3864-9494 [employer]. [address] [place] [address]. All rights reserved. This information is not intended as a substitute for professional medical care. Always follow your healthcare professional's instructions.

## 2020-09-02 ENCOUNTER — OFFICE VISIT (OUTPATIENT)
Dept: GASTROENTEROLOGY | Facility: CLINIC | Age: 30
End: 2020-09-02
Payer: COMMERCIAL

## 2020-09-02 VITALS — RESPIRATION RATE: 16 BRPM | HEIGHT: 62 IN | BODY MASS INDEX: 50.83 KG/M2 | WEIGHT: 276.25 LBS

## 2020-09-02 DIAGNOSIS — K58.0 IRRITABLE BOWEL SYNDROME WITH DIARRHEA: Primary | ICD-10-CM

## 2020-09-02 PROCEDURE — 99999 PR PBB SHADOW E&M-EST. PATIENT-LVL III: CPT | Mod: PBBFAC,,, | Performed by: INTERNAL MEDICINE

## 2020-09-02 PROCEDURE — 3008F PR BODY MASS INDEX (BMI) DOCUMENTED: ICD-10-PCS | Mod: CPTII,S$GLB,, | Performed by: INTERNAL MEDICINE

## 2020-09-02 PROCEDURE — 99214 PR OFFICE/OUTPT VISIT, EST, LEVL IV, 30-39 MIN: ICD-10-PCS | Mod: S$GLB,,, | Performed by: INTERNAL MEDICINE

## 2020-09-02 PROCEDURE — 99214 OFFICE O/P EST MOD 30 MIN: CPT | Mod: S$GLB,,, | Performed by: INTERNAL MEDICINE

## 2020-09-02 PROCEDURE — 3008F BODY MASS INDEX DOCD: CPT | Mod: CPTII,S$GLB,, | Performed by: INTERNAL MEDICINE

## 2020-09-02 PROCEDURE — 99999 PR PBB SHADOW E&M-EST. PATIENT-LVL III: ICD-10-PCS | Mod: PBBFAC,,, | Performed by: INTERNAL MEDICINE

## 2020-09-02 RX ORDER — DICYCLOMINE HYDROCHLORIDE 10 MG/1
10 CAPSULE ORAL 3 TIMES DAILY
Qty: 30 CAPSULE | Refills: 0 | Status: SHIPPED | OUTPATIENT
Start: 2020-09-02 | End: 2020-10-21

## 2020-09-02 RX ORDER — MULTIVIT-MINERALS/FOLIC ACID 200 MCG
1 TABLET,CHEWABLE ORAL DAILY
COMMUNITY

## 2020-09-02 RX ORDER — ACETAMINOPHEN 500 MG
3 TABLET ORAL DAILY
COMMUNITY

## 2020-09-02 RX ORDER — CALCIUM CARBONATE/VITAMIN D3 500-10/5ML
1 LIQUID (ML) ORAL DAILY
COMMUNITY

## 2020-09-02 RX ORDER — AMOXICILLIN 500 MG
CAPSULE ORAL DAILY
COMMUNITY

## 2020-09-02 RX ORDER — ACETYLCYSTEINE 600 MG
600 CAPSULE ORAL 3 TIMES DAILY
COMMUNITY
End: 2021-06-30

## 2020-09-02 NOTE — PATIENT INSTRUCTIONS
Dicyclomine tablets or capsules  What is this medicine?  DICYCLOMINE (dye [place] morgan) is used to treat bowel problems including irritable bowel syndrome.  How should I use this medicine?  Take this medicine by mouth with a glass of water. Follow the directions on the prescription label. It is best to take this medicine on an empty stomach, 30 minutes to 1 hour before meals. Take your medicine at regular intervals. Do not take your medicine more often than directed.  Talk to your pediatrician regarding the use of this medicine in children. Special care may be needed. While this drug may be prescribed for children as young as 6 months of age for selected conditions, precautions do apply.  Patients over 65 years old may have a stronger reaction and need a smaller dose.  What side effects may I notice from receiving this medicine?  Side effects that you should report to your doctor or health care professional as soon as possible:  · agitation, nervousness, confusion  · difficulty swallowing  · dizziness, drowsiness  · fast or slow heartbeat  · hallucinations  · pain or difficulty passing urine  Side effects that usually do not require medical attention (report to your doctor or health care professional if they continue or are bothersome):  · constipation  · headache  · nausea or vomiting  · sexual difficulty  What may interact with this medicine?  · amantadine  · antacids  · benztropine  · digoxin  · disopyramide  · medicines for allergies, colds and breathing difficulties  · medicines for alzheimer's disease  · medicines for anxiety or sleeping problems  · medicines for depression or psychotic disturbances  · medicines for diarrhea  · medicines for pain  · metoclopramide  · tegaserod  What if I miss a dose?  If you miss a dose, take it as soon as you can. If it is almost time for your next dose, take only that dose. Do not take double or extra doses.  Where should I keep my medicine?  Keep out of the reach of  children.  Store at room temperature below 30 degrees C (86 degrees F). Protect from light. Throw away any unused medicine after the expiration date.  What should I tell my health care provider before I take this medicine?  They need to know if you have any of these conditions:  · difficulty passing urine  · esophagus problems or heartburn  · glaucoma  · heart disease, or previous heart attack  · myasthenia gravis  · prostate trouble  · stomach infection, or obstruction  · ulcerative colitis  · an unusual or allergic reaction to dicyclomine, other medicines, foods, dyes, or preservatives  · pregnant or trying to get pregnant  · breast-feeding  What should I watch for while using this medicine?  You may get drowsy, dizzy, or have blurred vision. Do not drive, use machinery, or do anything that needs mental alertness until you know how this medicine affects you. To reduce the risk of dizzy or fainting spells, do not sit or stand up quickly, especially if you are an older patient. Alcohol can make you more drowsy, avoid alcoholic drinks.  Stay out of bright light and wear sunglasses if this medicine makes your eyes more sensitive to light.  Avoid extreme heat (hot tubs, saunas). This medicine can cause you to sweat less than normal. Your body temperature could increase to dangerous levels, which may lead to heat stroke.  Antacids can stop this medicine from working. If you get an upset stomach and want to take an antacid, make sure there is an interval of at least 1 to 2 hours before or after you take this medicine.  Your mouth may get dry. Chewing sugarless gum or sucking hard candy, and drinking plenty of water may help. Contact your doctor if the problem does not go away or is severe.  NOTE:This sheet is a summary. It may not cover all possible information. If you have questions about this medicine, talk to your doctor, pharmacist, or health care provider. Copyright© 2017 [employer]

## 2020-09-02 NOTE — PROGRESS NOTES
"     GASTROENTEROLOGY CLINIC NOTE    Chief Complaint: The encounter diagnosis was Irritable bowel syndrome with diarrhea.  Referring provider/PCP: Primary Doctor No    HPI:  Heidi Zhang Caro is a 29 y.o. female who is an established patient but new to me with a PMH of PCOS.  She is here today with c/o diarrhea. She reports having IBS symptoms in the past but reports over the past year her symptoms have worsened. Over the past year, her urgency and frequency have worsened. She describes having three bowel movements per day with the first producing a "good" amount of stool loose in consistency.  Her second and third BMs produce less stool but same loose consistency. One day ago, she reports having 7 BMs  but states she generally does not have this many BMs.   Denies blood or mucus in stool.  Reports occasional greasy or fatty stools but more often occurs with ingestion of meals high in fat. She does report having gas pain at night and generalized abdominal cramping that worsens after BMs. She also reports increasing her fiber intake and this has not helped her symptoms. She does take omeprazole as needed for occasional reflux. She also reports her and her  have started fertility treatments.       Treatments tried: Immodium     New medications: Metformin for PCOS which worsened GI symptoms. Has since stopped taking Metformin but frequent BMs continue.   Antibiotics: no  Travel: no  Sick contacts: no  Blood / mucus: no  Nocturnal urgency: Once over past year.     Prior Endoscopy: None  Prior Colonoscopy: None  Family h/o Colon Cancer: No; Mom with IBS  Abdominal Surgeries: None    GI ROS:  Reflux: No  Dysphagia: No   Constipation: No  Diarrhea: Yes  Rectal bleeding/Melena/hematemesis: No  NSAIDs: No  Anticoagulation: No  Anti-platelet therapy: No      Review of Systems   Constitutional: Negative for fever, malaise/fatigue and weight loss.   HENT: Negative for sore throat.    Eyes: Negative for blurred vision. "   Respiratory: Negative for cough.    Cardiovascular: Negative for chest pain.   Gastrointestinal: Positive for abdominal pain (cramping with bowel movements), diarrhea and heartburn (occasional). Negative for blood in stool, constipation, melena, nausea and vomiting.   Musculoskeletal: Negative for myalgias.   Skin: Negative for rash.   Neurological: Negative for headaches.   Endo/Heme/Allergies: Negative for environmental allergies.   Psychiatric/Behavioral: Negative for suicidal ideas. The patient is not nervous/anxious.        Past Medical History: has a past medical history of H/O chlamydia infection and PCOS (polycystic ovarian syndrome).    Past Surgical History: has a past surgical history that includes Tonsillectomy and [place] tooth extraction.    Family History:family history includes Breast cancer in her paternal aunt; Cancer in her paternal grandmother; Diabetes in her father, paternal grandfather, and paternal uncle; Heart disease in her maternal grandfather.    Allergies:   Review of patient's allergies indicates:   Allergen Reactions    Robitussin [guaifenesin] Other (See Comments)     dizziness       Social History: reports that she has never smoked. She has never used smokeless tobacco. She reports current alcohol use. She reports that she does not use drugs.    Home medications:   Current Outpatient Medications on File Prior to Visit   Medication Sig Dispense Refill    acetylcysteine (NAC) 600 mg Cap Take 600 mg by mouth 3 (three) times daily.      calcium-vitamin D 250-100 mg-unit per tablet Take 1 tablet by mouth 2 (two) times daily.      cholecalciferol, vitamin D3, (D3-2000) 50 mcg (2,000 unit) Cap Take 1 capsule by mouth once daily.      estradiol (VIVELLE-DOT) 0.1 mg/24 hr PTSW APPLY 1 PATCH ON PEAK PLUS 3 CHANGE PATCHES ON PEAK PLUS 6 AND PEAK PLUS 9 REMOVE ON PEAK PLUS 12  3    fluticasone (FLONASE) 50 mcg/actuation nasal spray 1 spray (50 mcg total) by Each Nare route once daily. 16  "g 0    lactobacillus comb no.10 (PROBIOTIC) 20 billion cell Cap Take by mouth.      letrozole (FEMARA) 2.5 mg Tab Take 2.5 mg by mouth.      magnesium oxide 400 mg magnesium Cap Take by mouth once.      omega-3 fatty acids/fish oil (FISH OIL-OMEGA-3 FATTY ACIDS) 300-1,000 mg capsule Take by mouth once daily.      prenatal vit calc,iron,folic (PRENATAL VITAMIN ORAL) Take by mouth.      progesterone (PROMETRIUM) 200 MG capsule Take 300 mg by mouth.      thyroid, pork, (NATURE-THROID) 32.5 mg Tab Take by mouth.      TURMERIC ORAL Take by mouth.      estrogens, conjugated, (PREMARIN) 1.25 MG tablet Take by mouth once daily.      omeprazole (PRILOSEC) 40 MG capsule Take 1 capsule (40 mg total) by mouth every morning. 30 capsule 11     No current facility-administered medications on file prior to visit.        Vital signs:  Resp 16   Ht 5' 2" (1.575 m)   Wt 125.3 kg (276 lb 3.8 oz)   BMI 50.52 kg/m²     Physical Exam  Vitals signs reviewed.   Constitutional:       General: She is not in acute distress.     Appearance: Normal appearance. She is not ill-appearing.   HENT:      Head: Normocephalic.   Cardiovascular:      Rate and Rhythm: Normal rate and regular rhythm.      Heart sounds: Normal heart sounds. No murmur.   Pulmonary:      Effort: Pulmonary effort is normal. No respiratory distress.      Breath sounds: Normal breath sounds.   Chest:      Chest wall: No tenderness.   Abdominal:      General: Bowel sounds are normal. There is no distension.      Palpations: Abdomen is soft.      Tenderness: There is abdominal tenderness (epigastric, LUQ, and LLQ). There is no guarding or rebound. Negative signs include Rodriguez's sign.      Hernia: No hernia is present.   Skin:     General: Skin is warm.   Neurological:      Mental Status: She is alert and oriented to person, place, and time.   Psychiatric:         Mood and Affect: Mood normal.         Behavior: Behavior normal.         Routine labs:  Lab Results "   Component Value Date    WBC 11.13 11/04/2016    HGB 12.7 11/04/2016    HCT 37.9 11/04/2016    MCV 82 11/04/2016     (H) 11/04/2016     No results found for: INR  No results found for: IRON, FERRITIN, TIBC, FESATURATED  Lab Results   Component Value Date     11/04/2016    K 4.4 11/04/2016     11/04/2016    CO2 21 (L) 11/04/2016    BUN 8 11/04/2016    CREATININE 0.6 11/04/2016     Lab Results   Component Value Date    ALBUMIN 3.6 11/04/2016    ALT 23 11/04/2016    AST 18 11/04/2016    ALKPHOS 122 11/04/2016    BILITOT 0.4 11/04/2016     No results found for: GLUCOSE    Imaging:    I have reviewed prior labs, imaging, and notes from the last month.      Assessment:  1. Irritable bowel syndrome with diarrhea        Plan:  1. IBS-D  -CBC, CMP, TSH  -Stool Studies  -Bentyl 10mg TID      Follow Up: 6 weeks      DUYEN Luo,FNP-BC [facility] [place]

## 2020-09-14 ENCOUNTER — TELEPHONE (OUTPATIENT)
Dept: GASTROENTEROLOGY | Facility: CLINIC | Age: 30
End: 2020-09-14

## 2020-09-18 ENCOUNTER — PATIENT MESSAGE (OUTPATIENT)
Dept: GASTROENTEROLOGY | Facility: CLINIC | Age: 30
End: 2020-09-18

## 2020-09-18 DIAGNOSIS — R10.13 EPIGASTRIC ABDOMINAL PAIN: Primary | ICD-10-CM

## 2020-09-18 RX ORDER — DICYCLOMINE HYDROCHLORIDE 10 MG/1
10 CAPSULE ORAL 3 TIMES DAILY
Qty: 90 CAPSULE | Refills: 1 | Status: CANCELLED | OUTPATIENT
Start: 2020-09-18

## 2020-09-18 NOTE — TELEPHONE ENCOUNTER
Informed patient all stool test ordered were not performed on stool sample. Will collect another sample.  Called in additional prescription for dicyclomine per Dr. Avilez. Patient will have labs done through PCP office on Monday.  Labs order faxed to Dr. Jad Walker's office at

## 2020-09-23 ENCOUNTER — PATIENT MESSAGE (OUTPATIENT)
Dept: GASTROENTEROLOGY | Facility: CLINIC | Age: 30
End: 2020-09-23

## 2020-09-23 ENCOUNTER — TELEPHONE (OUTPATIENT)
Dept: GASTROENTEROLOGY | Facility: HOSPITAL | Age: 30
End: 2020-09-23

## 2020-09-23 NOTE — TELEPHONE ENCOUNTER
Labs reviewed noting normal CBC; mild elevation in alkaline phosphatase (135), normal ttg. CRP 25 (0-10).

## 2020-09-29 ENCOUNTER — TELEPHONE (OUTPATIENT)
Dept: GASTROENTEROLOGY | Facility: CLINIC | Age: 30
End: 2020-09-29

## 2020-09-29 NOTE — TELEPHONE ENCOUNTER
----- Message from Alexi Avilez MD sent at 9/28/2020  1:54 PM CDT -----  C.diff negative, culture no growth to date, giardia negative; rest of the testing should be completed soon

## 2020-10-05 ENCOUNTER — TELEPHONE (OUTPATIENT)
Dept: GASTROENTEROLOGY | Facility: CLINIC | Age: 30
End: 2020-10-05

## 2020-10-05 NOTE — TELEPHONE ENCOUNTER
----- Message from Alexi Avilez MD sent at 10/5/2020 11:17 AM CDT -----  All stool studies finally resulted; no evidence of infectious etiologies, malabsorption, pancreatic insufficiency or parasitic infections. Has she seen any improvement on bentyl? If not I would give her a trial of rifaximin

## 2020-10-05 NOTE — TELEPHONE ENCOUNTER
I spoke with patient. She verbally understood. Patient stated that the bentyl worked for the first few days and now her symptoms are back. Patient would like to try different medication. Patient states that she is not pregnant at the moment, but they are trying. Patient is going on vacation this weekend and would like to know if its okay to take imodium for 7 days straight for the constant diarrhea.

## 2020-10-21 ENCOUNTER — OFFICE VISIT (OUTPATIENT)
Dept: GASTROENTEROLOGY | Facility: CLINIC | Age: 30
End: 2020-10-21
Payer: COMMERCIAL

## 2020-10-21 DIAGNOSIS — K58.0 IRRITABLE BOWEL SYNDROME WITH DIARRHEA: Primary | ICD-10-CM

## 2020-10-21 PROCEDURE — 99213 PR OFFICE/OUTPT VISIT, EST, LEVL III, 20-29 MIN: ICD-10-PCS | Mod: 95,,, | Performed by: INTERNAL MEDICINE

## 2020-10-21 PROCEDURE — 99213 OFFICE O/P EST LOW 20 MIN: CPT | Mod: 95,,, | Performed by: INTERNAL MEDICINE

## 2020-10-21 RX ORDER — OMEPRAZOLE 40 MG/1
40 CAPSULE, DELAYED RELEASE ORAL EVERY MORNING
Qty: 90 CAPSULE | Refills: 3 | Status: SHIPPED | OUTPATIENT
Start: 2020-10-21 | End: 2022-01-28 | Stop reason: SDUPTHER

## 2020-10-21 NOTE — PROGRESS NOTES
Subjective:       Patient ID: Heidi Zhang Caro is a 30 y.o. female.    Chief Complaint: No chief complaint on file.    This is a 30-year-old female for a follow-up visit regarding diarrhea predominant irritable bowel syndrome described as frequent, loose stools associated with increased stress and abdominal cramping.  Incomplete response with antispasmodic therapy and she was given a course of rifaximin.  She has not taken it as she recently went on vacation.  Overall she is feeling better. Recently she went to [facility] and noted no symptoms during that time. She felt normal then with minimal symptoms and took only on days in which she travelled. Since being back home she notes still feeling well.     The following portions of the patient's history were reviewed and updated as appropriate: allergies, current medications, past family history, past medical history, past social history, past surgical history and problem list.    (Portions of this note were dictated using voice recognition software and may contain dictation related errors in spelling/grammar/syntax not found on text review)  HPI  Review of Systems   Constitutional: Negative for fatigue and unexpected weight change.   Respiratory: Negative for wheezing and stridor.    Cardiovascular: Negative for chest pain and palpitations.   Gastrointestinal: Negative for abdominal distention and anal bleeding.         Objective:      Physical Exam  Constitutional:       Appearance: Normal appearance.   HENT:      Head: Normocephalic and atraumatic.   Eyes:      General: No scleral icterus.     Conjunctiva/sclera: Conjunctivae normal.   Pulmonary:      Effort: Pulmonary effort is normal. No respiratory distress.   Musculoskeletal:         General: No deformity or signs of injury.   Skin:     Coloration: Skin is not jaundiced or pale.   Neurological:      General: No focal deficit present.      Mental Status: She is alert and oriented to person, place, and time.    Psychiatric:         Mood and Affect: Mood normal.         Behavior: Behavior normal.           Labs/imaging; reviewed  Assessment:       1. Irritable bowel syndrome with diarrhea        Plan:   1. Continue to monitor symptoms; discussed pathophysiology of IBS-D  2. Continue dietary/lifestyle modifications    The patient location is: home  The chief complaint leading to consultation is: f/u    Visit type: audiovisual    Each patient to whom he or she provides medical services by telemedicine is:  (1) informed of the relationship between the physician and patient and the respective role of any other health care provider with respect to management of the patient; and (2) notified that he or she may decline to receive medical services by telemedicine and may withdraw from such care at any time.

## 2021-04-29 ENCOUNTER — TELEPHONE (OUTPATIENT)
Dept: GASTROENTEROLOGY | Facility: CLINIC | Age: 31
End: 2021-04-29

## 2021-04-29 ENCOUNTER — TELEPHONE (OUTPATIENT)
Dept: GASTROENTEROLOGY | Facility: HOSPITAL | Age: 31
End: 2021-04-29

## 2021-05-04 ENCOUNTER — PATIENT MESSAGE (OUTPATIENT)
Dept: RESEARCH | Facility: HOSPITAL | Age: 31
End: 2021-05-04

## 2021-06-30 PROBLEM — R31.29 MICROSCOPIC HEMATURIA: Status: ACTIVE | Noted: 2021-06-30

## 2021-06-30 PROBLEM — N23 RENAL COLIC: Status: ACTIVE | Noted: 2021-06-30

## 2021-06-30 PROBLEM — N20.1 CALCULUS OF URETER: Status: ACTIVE | Noted: 2021-06-30

## 2021-07-16 NOTE — TELEPHONE ENCOUNTER
I called patient to discuss results. Patient verbally understood. Patient states that [facility] had faxed over her labs.    show

## 2021-08-27 ENCOUNTER — OFFICE VISIT (OUTPATIENT)
Dept: GASTROENTEROLOGY | Facility: CLINIC | Age: 31
End: 2021-08-27
Payer: COMMERCIAL

## 2021-08-27 ENCOUNTER — TELEPHONE (OUTPATIENT)
Dept: GASTROENTEROLOGY | Facility: CLINIC | Age: 31
End: 2021-08-27

## 2021-08-27 DIAGNOSIS — R19.7 DIARRHEA, UNSPECIFIED TYPE: ICD-10-CM

## 2021-08-27 DIAGNOSIS — R07.9 CHEST PAIN, UNSPECIFIED TYPE: ICD-10-CM

## 2021-08-27 DIAGNOSIS — R10.13 EPIGASTRIC ABDOMINAL PAIN: Primary | ICD-10-CM

## 2021-08-27 PROCEDURE — 99214 OFFICE O/P EST MOD 30 MIN: CPT | Mod: 95,,, | Performed by: INTERNAL MEDICINE

## 2021-08-27 PROCEDURE — 1160F PR REVIEW ALL MEDS BY PRESCRIBER/CLIN PHARMACIST DOCUMENTED: ICD-10-PCS | Mod: CPTII,95,, | Performed by: INTERNAL MEDICINE

## 2021-08-27 PROCEDURE — 1159F MED LIST DOCD IN RCRD: CPT | Mod: CPTII,95,, | Performed by: INTERNAL MEDICINE

## 2021-08-27 PROCEDURE — 1160F RVW MEDS BY RX/DR IN RCRD: CPT | Mod: CPTII,95,, | Performed by: INTERNAL MEDICINE

## 2021-08-27 PROCEDURE — 99214 PR OFFICE/OUTPT VISIT, EST, LEVL IV, 30-39 MIN: ICD-10-PCS | Mod: 95,,, | Performed by: INTERNAL MEDICINE

## 2021-08-27 PROCEDURE — 1159F PR MEDICATION LIST DOCUMENTED IN MEDICAL RECORD: ICD-10-PCS | Mod: CPTII,95,, | Performed by: INTERNAL MEDICINE

## 2021-11-12 ENCOUNTER — TELEPHONE (OUTPATIENT)
Dept: GASTROENTEROLOGY | Facility: CLINIC | Age: 31
End: 2021-11-12
Payer: COMMERCIAL

## 2021-12-27 ENCOUNTER — TELEPHONE (OUTPATIENT)
Dept: ENDOSCOPY | Facility: HOSPITAL | Age: 31
End: 2021-12-27
Payer: COMMERCIAL

## 2021-12-27 ENCOUNTER — TELEPHONE (OUTPATIENT)
Dept: GASTROENTEROLOGY | Facility: CLINIC | Age: 31
End: 2021-12-27
Payer: COMMERCIAL

## 2021-12-27 RX ORDER — SODIUM, POTASSIUM,MAG SULFATES 17.5-3.13G
1 SOLUTION, RECONSTITUTED, ORAL ORAL DAILY
Qty: 1 KIT | Refills: 0 | Status: SHIPPED | OUTPATIENT
Start: 2021-12-27 | End: 2021-12-29

## 2021-12-29 ENCOUNTER — HOSPITAL ENCOUNTER (OUTPATIENT)
Facility: HOSPITAL | Age: 31
Discharge: HOME | End: 2021-12-29
Attending: INTERNAL MEDICINE | Admitting: INTERNAL MEDICINE
Payer: COMMERCIAL

## 2021-12-29 ENCOUNTER — ANESTHESIA (OUTPATIENT)
Dept: ENDOSCOPY | Facility: HOSPITAL | Age: 31
End: 2021-12-29
Payer: COMMERCIAL

## 2021-12-29 ENCOUNTER — ANESTHESIA EVENT (OUTPATIENT)
Dept: ENDOSCOPY | Facility: HOSPITAL | Age: 31
End: 2021-12-29
Payer: COMMERCIAL

## 2021-12-29 VITALS
WEIGHT: 275 LBS | TEMPERATURE: 98 F | HEIGHT: 62 IN | SYSTOLIC BLOOD PRESSURE: 130 MMHG | DIASTOLIC BLOOD PRESSURE: 66 MMHG | RESPIRATION RATE: 16 BRPM | HEART RATE: 83 BPM | OXYGEN SATURATION: 100 % | BODY MASS INDEX: 50.61 KG/M2

## 2021-12-29 DIAGNOSIS — K52.9 CHRONIC DIARRHEA: ICD-10-CM

## 2021-12-29 PROCEDURE — 88305 TISSUE EXAM BY PATHOLOGIST: ICD-10-PCS | Mod: 26,,, | Performed by: PATHOLOGY

## 2021-12-29 PROCEDURE — 63600175 PHARM REV CODE 636 W HCPCS: Performed by: NURSE ANESTHETIST, CERTIFIED REGISTERED

## 2021-12-29 PROCEDURE — 37000009 HC ANESTHESIA EA ADD 15 MINS: Performed by: INTERNAL MEDICINE

## 2021-12-29 PROCEDURE — 45380 PR COLONOSCOPY,BIOPSY: ICD-10-PCS | Mod: ,,, | Performed by: INTERNAL MEDICINE

## 2021-12-29 PROCEDURE — 43239 EGD BIOPSY SINGLE/MULTIPLE: CPT | Performed by: INTERNAL MEDICINE

## 2021-12-29 PROCEDURE — 88305 TISSUE EXAM BY PATHOLOGIST: CPT | Mod: 59 | Performed by: PATHOLOGY

## 2021-12-29 PROCEDURE — 25000003 PHARM REV CODE 250: Performed by: INTERNAL MEDICINE

## 2021-12-29 PROCEDURE — 45380 COLONOSCOPY AND BIOPSY: CPT | Mod: ,,, | Performed by: INTERNAL MEDICINE

## 2021-12-29 PROCEDURE — 88305 TISSUE EXAM BY PATHOLOGIST: CPT | Mod: 26,,, | Performed by: PATHOLOGY

## 2021-12-29 PROCEDURE — 43239 PR EGD, FLEX, W/BIOPSY, SGL/MULTI: ICD-10-PCS | Mod: 51,,, | Performed by: INTERNAL MEDICINE

## 2021-12-29 PROCEDURE — 37000008 HC ANESTHESIA 1ST 15 MINUTES: Performed by: INTERNAL MEDICINE

## 2021-12-29 PROCEDURE — 45380 COLONOSCOPY AND BIOPSY: CPT | Performed by: INTERNAL MEDICINE

## 2021-12-29 PROCEDURE — 27201012 HC FORCEPS, HOT/COLD, DISP: Performed by: INTERNAL MEDICINE

## 2021-12-29 PROCEDURE — 43239 EGD BIOPSY SINGLE/MULTIPLE: CPT | Mod: 51,,, | Performed by: INTERNAL MEDICINE

## 2021-12-29 PROCEDURE — 25000003 PHARM REV CODE 250: Performed by: NURSE ANESTHETIST, CERTIFIED REGISTERED

## 2021-12-29 RX ORDER — SODIUM CHLORIDE 9 MG/ML
INJECTION, SOLUTION INTRAVENOUS CONTINUOUS
Status: DISCONTINUED | OUTPATIENT
Start: 2021-12-29 | End: 2021-12-29 | Stop reason: HOSPADM

## 2021-12-29 RX ORDER — DEXTROMETHORPHAN/PSEUDOEPHED 2.5-7.5/.8
DROPS ORAL
Status: COMPLETED | OUTPATIENT
Start: 2021-12-29 | End: 2021-12-29

## 2021-12-29 RX ORDER — MIDAZOLAM HYDROCHLORIDE 1 MG/ML
INJECTION INTRAMUSCULAR; INTRAVENOUS
Status: DISCONTINUED | OUTPATIENT
Start: 2021-12-29 | End: 2021-12-29

## 2021-12-29 RX ORDER — SODIUM CHLORIDE 0.9 % (FLUSH) 0.9 %
10 SYRINGE (ML) INJECTION
Status: DISCONTINUED | OUTPATIENT
Start: 2021-12-29 | End: 2021-12-29 | Stop reason: HOSPADM

## 2021-12-29 RX ORDER — PROPOFOL 10 MG/ML
VIAL (ML) INTRAVENOUS CONTINUOUS PRN
Status: DISCONTINUED | OUTPATIENT
Start: 2021-12-29 | End: 2021-12-29

## 2021-12-29 RX ORDER — LIDOCAINE HCL/PF 100 MG/5ML
SYRINGE (ML) INTRAVENOUS
Status: DISCONTINUED | OUTPATIENT
Start: 2021-12-29 | End: 2021-12-29

## 2021-12-29 RX ORDER — PROPOFOL 10 MG/ML
VIAL (ML) INTRAVENOUS
Status: DISCONTINUED | OUTPATIENT
Start: 2021-12-29 | End: 2021-12-29

## 2021-12-29 RX ADMIN — PROPOFOL 80 MG: 10 INJECTION, EMULSION INTRAVENOUS at 01:12

## 2021-12-29 RX ADMIN — LIDOCAINE HYDROCHLORIDE 75 MG: 20 INJECTION, SOLUTION INTRAVENOUS at 01:12

## 2021-12-29 RX ADMIN — GLYCOPYRROLATE 0.1 MG: 0.2 INJECTION, SOLUTION INTRAMUSCULAR; INTRAVITREAL at 12:12

## 2021-12-29 RX ADMIN — PROPOFOL 20 MG: 10 INJECTION, EMULSION INTRAVENOUS at 01:12

## 2021-12-29 RX ADMIN — SODIUM CHLORIDE: 0.9 INJECTION, SOLUTION INTRAVENOUS at 11:12

## 2021-12-29 RX ADMIN — PROPOFOL 200 MCG/KG/MIN: 10 INJECTION, EMULSION INTRAVENOUS at 01:12

## 2021-12-29 RX ADMIN — PROPOFOL 40 MG: 10 INJECTION, EMULSION INTRAVENOUS at 01:12

## 2021-12-29 RX ADMIN — SIMETHICONE 40 MG: 20 SUSPENSION/ DROPS ORAL at 01:12

## 2021-12-29 RX ADMIN — MIDAZOLAM HYDROCHLORIDE 2 MG: 1 INJECTION, SOLUTION INTRAMUSCULAR; INTRAVENOUS at 12:12

## 2021-12-29 NOTE — [employer]OVATION PATIENT INSTRUCTIONS
Discharge Summary/Instructions after an Endoscopic Procedure  Patient Name: Heidi Haynes  Patient MRN: [id number]  Patient YOB: 1990     Wednesday, December 29, 2021  Davi Baldwin MD  Dear patient,  As a result of recent federal legislation (The Federal Cures Act), you may   receive lab or pathology results from your procedure in your [facility]   account before your physician is able to contact you. Your physician or   their representative will relay the results to you with their   recommendations at their soonest availability.  Thank you,  Your health is very important to us during the Covid Crisis. Following your   procedure today, you will receive a daily text for 2 weeks asking about   signs or symptoms of Covid 19.  Please respond to this text when you   receive it so we can follow up and keep you as safe as possible.   RESTRICTIONS:  During your procedure today, you received medications for sedation.  These   medications may affect your judgment, balance and coordination.  Therefore,   for 24 hours, you have the following restrictions:   - DO NOT drive a car, operate machinery, make legal/financial decisions,   sign important papers or drink alcohol.    ACTIVITY:  Today: no heavy lifting, straining or running due to procedural   sedation/anesthesia.  The following day: return to full activity including work.  DIET:  Eat and drink normally unless instructed otherwise.     TREATMENT FOR COMMON SIDE EFFECTS:  - Mild abdominal pain, nausea, belching, bloating or excessive gas:  rest,   eat lightly and use a heating pad.  - Sore Throat: treat with throat lozenges and/or gargle with warm salt   water.  - Because air was used during the procedure, expelling large amounts of air   from your rectum or belching is normal.  - If a bowel prep was taken, you may not have a bowel movement for 1-3 days.    This is normal.  SYMPTOMS TO WATCH FOR AND REPORT TO YOUR PHYSICIAN:  1. Abdominal pain or bloating, other than  gas cramps.  2. Chest pain.  3. Back pain.  4. Signs of infection such as: chills or fever occurring within 24 hours   after the procedure.  5. Rectal bleeding, which would show as bright red, maroon, or black stools.   (A tablespoon of blood from the rectum is not serious, especially if   hemorrhoids are present.)  6. Vomiting.  7. Weakness or dizziness.  GO DIRECTLY TO THE NEAREST EMERGENCY ROOM IF YOU HAVE ANY OF THE FOLLOWING:      Difficulty breathing              Chills and/or fever over 101 F   Persistent vomiting and/or vomiting blood   Severe abdominal pain   Severe chest pain   Black, tarry stools   Bleeding- more than one tablespoon   Any other symptom or condition that you feel may need urgent attention  Your doctor recommends these additional instructions:  If any biopsies were taken, your doctors clinic will contact you in 1 to 2   weeks with any results.  - Discharge patient to home.   - Patient has a contact number available for emergencies.  The signs and   symptoms of potential delayed complications were discussed with the   patient.  Return to normal activities tomorrow.  Written discharge   instructions were provided to the patient.   - Resume previous diet.   - Continue present medications.   - Await pathology results.   - Repeat colonoscopy at age 44 for screening purposes.  For questions, problems or results please call your physician - Davi Baldwin MD.  EMERGENCY PHONE NUMBER: [phone],  LAB RESULTS: [phone]  IF A COMPLICATION OR EMERGENCY SITUATION ARISES AND YOU ARE UNABLE TO REACH   YOUR PHYSICIAN - GO DIRECTLY TO THE EMERGENCY ROOM.  Davi Baldwin MD  12/29/2021 1:32:47 PM  This report has been verified and signed electronically.  Dear patient,  As a result of recent federal legislation (The Federal Cures Act), you may   receive lab or pathology results from your procedure in your [facility]   account before your physician is able to contact you. Your physician or    their representative will relay the results to you with their   recommendations at their soonest availability.  Thank you,  [employer]OVATION

## 2021-12-29 NOTE — PROVATION PATIENT INSTRUCTIONS
Discharge Summary/Instructions after an Endoscopic Procedure  Patient Name: Heidi Haynes  Patient MRN: [id number]  Patient YOB: 1990     Wednesday, December 29, 2021  Davi Baldwin MD  Dear patient,  As a result of recent federal legislation (The Federal Cures Act), you may   receive lab or pathology results from your procedure in your [facility]   account before your physician is able to contact you. Your physician or   their representative will relay the results to you with their   recommendations at their soonest availability.  Thank you,  Your health is very important to us during the Covid Crisis. Following your   procedure today, you will receive a daily text for 2 weeks asking about   signs or symptoms of Covid 19.  Please respond to this text when you   receive it so we can follow up and keep you as safe as possible.   RESTRICTIONS:  During your procedure today, you received medications for sedation.  These   medications may affect your judgment, balance and coordination.  Therefore,   for 24 hours, you have the following restrictions:   - DO NOT drive a car, operate machinery, make legal/financial decisions,   sign important papers or drink alcohol.    ACTIVITY:  Today: no heavy lifting, straining or running due to procedural   sedation/anesthesia.  The following day: return to full activity including work.  DIET:  Eat and drink normally unless instructed otherwise.     TREATMENT FOR COMMON SIDE EFFECTS:  - Mild abdominal pain, nausea, belching, bloating or excessive gas:  rest,   eat lightly and use a heating pad.  - Sore Throat: treat with throat lozenges and/or gargle with warm salt   water.  - Because air was used during the procedure, expelling large amounts of air   from your rectum or belching is normal.  - If a bowel prep was taken, you may not have a bowel movement for 1-3 days.    This is normal.  SYMPTOMS TO WATCH FOR AND REPORT TO YOUR PHYSICIAN:  1. Abdominal pain or bloating, other than  gas cramps.  2. Chest pain.  3. Back pain.  4. Signs of infection such as: chills or fever occurring within 24 hours   after the procedure.  5. Rectal bleeding, which would show as bright red, maroon, or black stools.   (A tablespoon of blood from the rectum is not serious, especially if   hemorrhoids are present.)  6. Vomiting.  7. Weakness or dizziness.  GO DIRECTLY TO THE NEAREST EMERGENCY ROOM IF YOU HAVE ANY OF THE FOLLOWING:      Difficulty breathing              Chills and/or fever over 101 F   Persistent vomiting and/or vomiting blood   Severe abdominal pain   Severe chest pain   Black, tarry stools   Bleeding- more than one tablespoon   Any other symptom or condition that you feel may need urgent attention  Your doctor recommends these additional instructions:  If any biopsies were taken, your doctors clinic will contact you in 1 to 2   weeks with any results.  - Discharge patient to home.   - Patient has a contact number available for emergencies.  The signs and   symptoms of potential delayed complications were discussed with the   patient.  Return to normal activities tomorrow.  Written discharge   instructions were provided to the patient.   - Resume previous diet.   - Continue present medications.   - Await pathology results.   - Perform a colonoscopy today.   - Await path results, will likely need EUS of 2nd portion of duodenum /   periampullary area and possible CT imaging.  For questions, problems or results please call your physician - Davi Baldwin MD.  EMERGENCY PHONE NUMBER: [phone],  LAB RESULTS: [phone]  IF A COMPLICATION OR EMERGENCY SITUATION ARISES AND YOU ARE UNABLE TO REACH   YOUR PHYSICIAN - GO DIRECTLY TO THE EMERGENCY ROOM.  Davi Baldwin MD  12/29/2021 1:18:44 PM  This report has been verified and signed electronically.  Dear patient,  As a result of recent federal legislation (The Federal Cures Act), you may   receive lab or pathology results from your procedure  in your [facility]   account before your physician is able to contact you. Your physician or   their representative will relay the results to you with their   recommendations at their soonest availability.  Thank you,  PROVATION

## 2021-12-29 NOTE — H&P
Short Stay Endoscopy History and Physical    PCP - Jad Walker MD    Procedure - Colonoscopy  + EGD  ASA - per anesthesia  Mallampati - per anesthesia  History of Anesthesia problems - no  Family history Anesthesia problems - no   Plan of anesthesia - General    HPI:  This is a 31 y.o. female here for evaluation of :   egd and colon for abd pain, diarrhea,       ROS:  Constitutional: No fevers, chills, No weight loss  CV: No chest pain  Pulm: No cough, No shortness of breath  GI: see HPI  Derm: No rash    Medical History:  has a past medical history of Ectopic pregnancy, Endometriosis, GERD (gastroesophageal reflux disease), H/O chlamydia infection, Hiatal hernia (07/2021), IBS (irritable bowel syndrome), Insulin resistance, Kidney stone, PCOS (polycystic ovarian syndrome), and PONV (postoperative nausea and vomiting).    Surgical History:  has a past surgical history that includes Tonsillectomy; [place] tooth extraction; Diagnostic laparoscopy; Dilation and curettage of uterus; Cystoscopy w/ retrogrades (Left, 7/1/2021); Ureteroscopy (Left, 7/1/2021); Extracorporeal shock wave lithotripsy (Left, 7/1/2021); Internal urethrotomy (N/A, 7/1/2021); and Laser lithotripsy (Left, 7/1/2021).    Family History: family history includes Breast cancer in her paternal aunt; Cancer in her paternal grandmother; Diabetes in her father, paternal grandfather, and paternal uncle; Heart disease in her maternal grandfather; Hypertension in her mother.. Otherwise no colon cancer, inflammatory bowel disease, or GI malignancies.    Social History:  reports that she has never smoked. She has never used smokeless tobacco. She reports current alcohol use. She reports that she does not use drugs.    Review of patient's allergies indicates:  No Known Allergies    Medications:   Facility-Administered Medications Prior to Admission   Medication Dose Route Frequency Provider Last Rate Last Admin    acetaminophen tablet 650 mg  650 mg Oral  Once PRN Jad Walker MD        albuterol inhaler 2 puff  2 puff Inhalation Q20 Min PRN Jad Walker MD        diphenhydrAMINE injection 25 mg  25 mg Intravenous Once PRN Jad Walker MD        EPINEPHrine (EPIPEN) 0.3 mg/0.3 mL pen injection 0.3 mg  0.3 mg Intramuscular PRN Jad Walker MD        methylPREDNISolone sodium succinate injection 40 mg  40 mg Intravenous Once PRN Jad Walker MD        ondansetron disintegrating tablet 4 mg  4 mg Oral Once PRN Jad Walker MD        sodium chloride 0.9% 500 mL flush bag   Intravenous PRN Jad Walker MD        sodium chloride 0.9% flush 10 mL  10 mL Intravenous PRN Jad Walker MD         Medications Prior to Admission   Medication Sig Dispense Refill Last Dose    fluticasone (FLONASE) 50 mcg/actuation nasal spray 1 spray (50 mcg total) by Each Nare route once daily. 16 g 0 12/29/2021 at Unknown time    sodium,potassium,mag sulfates (SUPREP BOWEL PREP KIT) 17.5-3.13-1.6 gram SolR Take 177 mLs by mouth once daily. for 2 days 1 kit 0 12/29/2021 at Unknown time    UNABLE TO FIND HCG injections   12/28/2021 at Unknown time    cholecalciferol, vitamin D3, (VITAMIN D3) 50 mcg (2,000 unit) Cap Take 3 capsules by mouth once daily.    12/27/2021    dicyclomine (BENTYL) 20 mg tablet Take 20 mg by mouth daily as needed.   Unknown at Unknown time    lactobacillus comb no.10 (PROBIOTIC) 20 billion cell Cap Take by mouth.   12/27/2021    MAGNESIUM CITRATE ORAL Take 250 mg by mouth once daily.   12/27/2021    magnesium oxide 400 mg magnesium Cap Take by mouth once.       omega-3 fatty acids/fish oil (FISH OIL-OMEGA-3 FATTY ACIDS) 300-1,000 mg capsule Take by mouth once daily.   12/27/2021    omeprazole (PRILOSEC) 40 MG capsule Take 1 capsule (40 mg total) by mouth every morning. (Patient taking differently: Take 40 mg by mouth daily as needed. ) 90 capsule 3     prenatal vit calc,iron,folic (PRENATAL VITAMIN ORAL) Take by mouth.   12/27/2021     progesterone (PROMETRIUM) 200 MG capsule Take 300 mg by mouth.   Unknown at Unknown time    thyroid, pork, (NATURE-THROID) 32.5 mg Tab Take by mouth.   12/27/2021    TURMERIC ORAL Take by mouth.   12/27/2021    UNABLE TO FIND 1 tablet once daily. Coenzyme B6, P-5-P   12/27/2021    UNABLE TO FIND 2,000 mg once daily. eric-inositol   12/27/2021         Physical Exam:    Vital Signs:   Vitals:    12/29/21 1147   BP: (!) 147/85   Pulse: 100   Resp: 18   Temp: 99.1 °F (37.3 °C)       General Appearance: Well appearing in no acute distress  Eyes:    No scleral icterus  ENT: Neck supple, Lips, mucosa, and tongue normal; teeth and gums normal  Abdomen: Soft, non tender, non distended with positive bowel sounds. No hepatosplenomegaly, ascites, or mass.  Extremities: 2+ pulses, no clubbing, cyanosis or edema  Skin: No rash      Labs:  Lab Results   Component Value Date    WBC 10.60 07/01/2021    HGB 11.7 (L) 07/01/2021    HCT 35.6 (L) 07/01/2021     07/01/2021    CHOL 193 09/04/2014    TRIG 60 09/04/2014    HDL 66 09/04/2014    ALT 23 11/04/2016    AST 18 11/04/2016     07/01/2021    K 3.7 07/01/2021     07/01/2021    CREATININE 0.76 07/01/2021    BUN 11 07/01/2021    CO2 27 07/01/2021    TSH 2.147 09/04/2014    HGBA1C 5.3 09/04/2014       I have explained the risks and benefits of endoscopy procedures to the patient including but not limited to bleeding, perforation, infection, and death.  The patient was asked if they understand and allowed to ask any further questions to their satisfaction.    Davi Baldwin MD

## 2021-12-30 ENCOUNTER — TELEPHONE (OUTPATIENT)
Dept: ENDOSCOPY | Facility: HOSPITAL | Age: 31
End: 2021-12-30
Payer: COMMERCIAL

## 2022-01-05 ENCOUNTER — PATIENT MESSAGE (OUTPATIENT)
Dept: GASTROENTEROLOGY | Facility: CLINIC | Age: 32
End: 2022-01-05
Payer: COMMERCIAL

## 2022-01-05 LAB
FINAL PATHOLOGIC DIAGNOSIS: NORMAL
GROSS: NORMAL
Lab: NORMAL

## 2022-01-20 ENCOUNTER — OFFICE VISIT (OUTPATIENT)
Dept: GASTROENTEROLOGY | Facility: CLINIC | Age: 32
End: 2022-01-20
Payer: COMMERCIAL

## 2022-01-20 ENCOUNTER — TELEPHONE (OUTPATIENT)
Dept: GASTROENTEROLOGY | Facility: CLINIC | Age: 32
End: 2022-01-20

## 2022-01-20 VITALS
SYSTOLIC BLOOD PRESSURE: 127 MMHG | HEIGHT: 62 IN | DIASTOLIC BLOOD PRESSURE: 84 MMHG | WEIGHT: 279.56 LBS | BODY MASS INDEX: 51.45 KG/M2 | HEART RATE: 76 BPM

## 2022-01-20 DIAGNOSIS — D13.2 ADENOMATOUS DUODENAL POLYP: Primary | ICD-10-CM

## 2022-01-20 PROCEDURE — 1160F RVW MEDS BY RX/DR IN RCRD: CPT | Mod: CPTII,S$GLB,, | Performed by: INTERNAL MEDICINE

## 2022-01-20 PROCEDURE — 1159F MED LIST DOCD IN RCRD: CPT | Mod: CPTII,S$GLB,, | Performed by: INTERNAL MEDICINE

## 2022-01-20 PROCEDURE — 99999 PR PBB SHADOW E&M-EST. PATIENT-LVL IV: CPT | Mod: PBBFAC,,, | Performed by: INTERNAL MEDICINE

## 2022-01-20 PROCEDURE — 3008F PR BODY MASS INDEX (BMI) DOCUMENTED: ICD-10-PCS | Mod: CPTII,S$GLB,, | Performed by: INTERNAL MEDICINE

## 2022-01-20 PROCEDURE — 99999 PR PBB SHADOW E&M-EST. PATIENT-LVL IV: ICD-10-PCS | Mod: PBBFAC,,, | Performed by: INTERNAL MEDICINE

## 2022-01-20 PROCEDURE — 3079F PR MOST RECENT DIASTOLIC BLOOD PRESSURE 80-89 MM HG: ICD-10-PCS | Mod: CPTII,S$GLB,, | Performed by: INTERNAL MEDICINE

## 2022-01-20 PROCEDURE — 99214 PR OFFICE/OUTPT VISIT, EST, LEVL IV, 30-39 MIN: ICD-10-PCS | Mod: S$GLB,,, | Performed by: INTERNAL MEDICINE

## 2022-01-20 PROCEDURE — 3008F BODY MASS INDEX DOCD: CPT | Mod: CPTII,S$GLB,, | Performed by: INTERNAL MEDICINE

## 2022-01-20 PROCEDURE — 99214 OFFICE O/P EST MOD 30 MIN: CPT | Mod: S$GLB,,, | Performed by: INTERNAL MEDICINE

## 2022-01-20 PROCEDURE — 3074F SYST BP LT 130 MM HG: CPT | Mod: CPTII,S$GLB,, | Performed by: INTERNAL MEDICINE

## 2022-01-20 PROCEDURE — 1159F PR MEDICATION LIST DOCUMENTED IN MEDICAL RECORD: ICD-10-PCS | Mod: CPTII,S$GLB,, | Performed by: INTERNAL MEDICINE

## 2022-01-20 PROCEDURE — 3074F PR MOST RECENT SYSTOLIC BLOOD PRESSURE < 130 MM HG: ICD-10-PCS | Mod: CPTII,S$GLB,, | Performed by: INTERNAL MEDICINE

## 2022-01-20 PROCEDURE — 1160F PR REVIEW ALL MEDS BY PRESCRIBER/CLIN PHARMACIST DOCUMENTED: ICD-10-PCS | Mod: CPTII,S$GLB,, | Performed by: INTERNAL MEDICINE

## 2022-01-20 PROCEDURE — 3079F DIAST BP 80-89 MM HG: CPT | Mod: CPTII,S$GLB,, | Performed by: INTERNAL MEDICINE

## 2022-01-20 NOTE — PROGRESS NOTES
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  This was found on routine endoscopy for irritable bowel symptoms.  She has no jaundice no history of pancreatitis.  She had a colonoscopy at the same time that showed no polyps.  She her family history includes an aunt with breast cancer , a mother with colon polyps and a father  with prostate cancer.      (Not in a hospital admission)      Review of patient's allergies indicates:  No Known Allergies     Past Medical History:   Diagnosis Date    Ectopic pregnancy     Endometriosis     GERD (gastroesophageal reflux disease)     H/O chlamydia infection     Hiatal hernia 07/2021    IBS (irritable bowel syndrome)     Insulin resistance     Kidney stone     PCOS (polycystic ovarian syndrome)     PONV (postoperative nausea and vomiting)      Past Surgical History:   Procedure Laterality Date    COLONOSCOPY N/A 12/29/2021    Procedure: COLONOSCOPY Patient had a positive covid test on 12/6/22. She will bring positive results with her to the hospital.;  Surgeon: Davi Baldwin MD;  [facility];  Service: Endoscopy;  Laterality: N/A;    CYSTOSCOPY W/ RETROGRADES Left 7/1/2021    Procedure: CYSTOSCOPY, WITH RETROGRADE PYELOGRAM;  Surgeon: Sam Griffiths MD;  Location: [facility] OR;  Service: Urology;  Laterality: Left;    DIAGNOSTIC LAPAROSCOPY      DILATION AND CURETTAGE OF UTERUS      ESOPHAGOGASTRODUODENOSCOPY N/A 12/29/2021    Procedure: EGD (ESOPHAGOGASTRODUODENOSCOPY);  Surgeon: Davi Baldwin MD;  Location: [facility] ENDO;  Service: Endoscopy;  Laterality: N/A;    EXTRACORPOREAL SHOCK WAVE LITHOTRIPSY Left 7/1/2021    Procedure: LITHOTRIPSY, ESWL Attempted;  Surgeon: Sam Griffiths MD;  Location: [facility] OR;  Service: Urology;  Laterality: Left;    INTERNAL URETHROTOMY N/A 7/1/2021     Procedure: URETHROTOMY, INTERNAL;  Surgeon: Sam Griffiths MD;  Location: [facility] OR;  Service: Urology;  Laterality: N/A;    LASER LITHOTRIPSY Left 7/1/2021    Procedure: LITHOTRIPSY, USING LASER;  Surgeon: Sam Griffiths MD;  Location: [facility] OR;  Service: Urology;  Laterality: Left;    TONSILLECTOMY      URETEROSCOPY Left 7/1/2021    Procedure: URETEROSCOPY;  Surgeon: Sam Griffiths MD;  Location: [facility] OR;  Service: Urology;  Laterality: Left;    WISDOM TOOTH EXTRACTION       Family History   Problem Relation Age of Onset    Diabetes Father     Hypertension Mother     Diabetes Paternal Uncle     Heart disease Maternal Grandfather     Cancer Paternal Grandmother         leukemia    Diabetes Paternal Grandfather     Breast cancer Paternal Aunt     Colon cancer Neg Hx     Ovarian cancer Neg Hx     Glaucoma Neg Hx     Amblyopia Neg Hx     Blindness Neg Hx     Macular degeneration Neg Hx     Retinal detachment Neg Hx     Strabismus Neg Hx      Social History     Tobacco Use    Smoking status: Never Smoker    Smokeless tobacco: Never Used   Substance Use Topics    Alcohol use: Yes     Alcohol/week: 0.0 standard drinks     Comment: socially    Drug use: No       Review of Systems:  A complete review of systems was performed and other than described in the HPI and below is negative       OBJECTIVE:     Vital Signs (Most Recent)  Pulse: 76 (01/20/22 0815)  BP: 127/84 (01/20/22 0815)        Diagnostic Results:  EGD and biop[sy results reviewed      ASSESSMENT/PLAN:     I personally reviewed the endoscopy and biopsy results with the patient and her .  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  We discussed options to include an endoscopic ultrasound and ERCP for endoscopic removal and alternatively referral to surgery.  She opted for the less invasive means of management including the EUS and ERCP.  We went over the risks of pancreatitis during the ERCP to be  approximately 25%.  She discussed their efforts of becoming pregnant and the impact this would have on her procedure timing.  We will try to schedule this as soon as possible.,    I spent 30 minutes with the patient., over 50% of it in coordinating care and counseling the patient

## 2022-01-20 NOTE — TELEPHONE ENCOUNTER
Hey, patient was seen in clinic today with Dr. Velazquez.  Per Marcus, he wants patient to get scheduled for an ERCP at [facility]. Can you assist with scheduling patient please?

## 2022-01-24 ENCOUNTER — HOSPITAL ENCOUNTER (OUTPATIENT)
Dept: LAB | Facility: HOSPITAL | Age: 32
Discharge: HOME | End: 2022-01-24
Attending: FAMILY MEDICINE
Payer: COMMERCIAL

## 2022-01-24 ENCOUNTER — TELEPHONE (OUTPATIENT)
Dept: ENDOSCOPY | Facility: HOSPITAL | Age: 32
End: 2022-01-24
Payer: COMMERCIAL

## 2022-01-24 ENCOUNTER — PATIENT MESSAGE (OUTPATIENT)
Dept: ENDOSCOPY | Facility: HOSPITAL | Age: 32
End: 2022-01-24
Payer: COMMERCIAL

## 2022-01-24 DIAGNOSIS — Z01.812 PRE-PROCEDURE LAB EXAM: Primary | ICD-10-CM

## 2022-01-24 DIAGNOSIS — Z01.812 PRE-PROCEDURE LAB EXAM: ICD-10-CM

## 2022-01-24 LAB — SARS-COV-2 RNA RESP QL NAA+PROBE: NOT DETECTED

## 2022-01-24 PROCEDURE — U0002 COVID-19 LAB TEST NON-CDC: HCPCS | Performed by: FAMILY MEDICINE

## 2022-01-24 NOTE — TELEPHONE ENCOUNTER
Received request to schedule patient for ERCP/EUS on 1/27/22 at 9:15 am. Spoke with patient. Reviewed medical history and medications. Instructed on procedure and prep. Patient verbalized understanding of instructions. Copy of instructions sent via pt portal.   0

## 2022-01-27 ENCOUNTER — NURSE TRIAGE (OUTPATIENT)
Dept: ADMINISTRATIVE | Facility: CLINIC | Age: 32
End: 2022-01-27
Payer: COMMERCIAL

## 2022-01-27 ENCOUNTER — ANESTHESIA (OUTPATIENT)
Dept: ENDOSCOPY | Facility: HOSPITAL | Age: 32
End: 2022-01-27
Payer: COMMERCIAL

## 2022-01-27 ENCOUNTER — ANESTHESIA EVENT (OUTPATIENT)
Dept: ENDOSCOPY | Facility: HOSPITAL | Age: 32
End: 2022-01-27
Payer: COMMERCIAL

## 2022-01-27 ENCOUNTER — HOSPITAL ENCOUNTER (OUTPATIENT)
Facility: HOSPITAL | Age: 32
Discharge: HOME | End: 2022-01-27
Attending: INTERNAL MEDICINE | Admitting: INTERNAL MEDICINE
Payer: COMMERCIAL

## 2022-01-27 VITALS
OXYGEN SATURATION: 98 % | WEIGHT: 275 LBS | SYSTOLIC BLOOD PRESSURE: 127 MMHG | DIASTOLIC BLOOD PRESSURE: 74 MMHG | TEMPERATURE: 98 F | RESPIRATION RATE: 22 BRPM | BODY MASS INDEX: 50.61 KG/M2 | HEART RATE: 61 BPM | HEIGHT: 62 IN

## 2022-01-27 DIAGNOSIS — D13.5 AMPULLARY ADENOMA: ICD-10-CM

## 2022-01-27 LAB
B-HCG UR QL: NEGATIVE
CTP QC/QA: YES

## 2022-01-27 PROCEDURE — 88305 TISSUE EXAM BY PATHOLOGIST: ICD-10-PCS | Mod: 26,,, | Performed by: PATHOLOGY

## 2022-01-27 PROCEDURE — 88342 CHG IMMUNOCYTOCHEMISTRY: ICD-10-PCS | Mod: 26,,, | Performed by: PATHOLOGY

## 2022-01-27 PROCEDURE — 88342 IMHCHEM/IMCYTCHM 1ST ANTB: CPT | Performed by: PATHOLOGY

## 2022-01-27 PROCEDURE — D9220A PRA ANESTHESIA: ICD-10-PCS | Mod: ,,, | Performed by: ANESTHESIOLOGY

## 2022-01-27 PROCEDURE — 27201042 HC RETRIEVAL NET: Performed by: INTERNAL MEDICINE

## 2022-01-27 PROCEDURE — 88305 TISSUE EXAM BY PATHOLOGIST: CPT | Mod: 26,,, | Performed by: PATHOLOGY

## 2022-01-27 PROCEDURE — 25500020 PHARM REV CODE 255: Performed by: INTERNAL MEDICINE

## 2022-01-27 PROCEDURE — D9220A PRA ANESTHESIA: ICD-10-PCS | Mod: ,,, | Performed by: STUDENT IN AN ORGANIZED HEALTH CARE EDUCATION/TRAINING PROGRAM

## 2022-01-27 PROCEDURE — 27201014 HC GRASPER DEVICE: Performed by: INTERNAL MEDICINE

## 2022-01-27 PROCEDURE — 27201674 HC SPHINCTERTOME: Performed by: INTERNAL MEDICINE

## 2022-01-27 PROCEDURE — 25000003 PHARM REV CODE 250: Performed by: STUDENT IN AN ORGANIZED HEALTH CARE EDUCATION/TRAINING PROGRAM

## 2022-01-27 PROCEDURE — 43259 EGD US EXAM DUODENUM/JEJUNUM: CPT | Mod: 51,,, | Performed by: INTERNAL MEDICINE

## 2022-01-27 PROCEDURE — D9220A PRA ANESTHESIA: Mod: ,,, | Performed by: ANESTHESIOLOGY

## 2022-01-27 PROCEDURE — 81025 URINE PREGNANCY TEST: CPT | Performed by: INTERNAL MEDICINE

## 2022-01-27 PROCEDURE — 88342 IMHCHEM/IMCYTCHM 1ST ANTB: CPT | Mod: 26,,, | Performed by: PATHOLOGY

## 2022-01-27 PROCEDURE — 43254 PR EGD, FLEX, W/MUCOSAL RESECTION: ICD-10-PCS | Mod: 59,,, | Performed by: INTERNAL MEDICINE

## 2022-01-27 PROCEDURE — 74329 PR  X-RAY FOR PANCREAS ENDOSCOPY: ICD-10-PCS | Mod: 26,,, | Performed by: INTERNAL MEDICINE

## 2022-01-27 PROCEDURE — 43274 ERCP DUCT STENT PLACEMENT: CPT | Mod: ,,, | Performed by: INTERNAL MEDICINE

## 2022-01-27 PROCEDURE — 27201028 HC NEEDLE, SCLERO: Performed by: INTERNAL MEDICINE

## 2022-01-27 PROCEDURE — 63600175 PHARM REV CODE 636 W HCPCS: Performed by: STUDENT IN AN ORGANIZED HEALTH CARE EDUCATION/TRAINING PROGRAM

## 2022-01-27 PROCEDURE — 25000003 PHARM REV CODE 250: Performed by: ANESTHESIOLOGY

## 2022-01-27 PROCEDURE — 37000008 HC ANESTHESIA 1ST 15 MINUTES: Performed by: INTERNAL MEDICINE

## 2022-01-27 PROCEDURE — 27200997: Performed by: INTERNAL MEDICINE

## 2022-01-27 PROCEDURE — 27201089 HC SNARE, DISP (ANY): Performed by: INTERNAL MEDICINE

## 2022-01-27 PROCEDURE — 74329 X-RAY FOR PANCREAS ENDOSCOPY: CPT | Performed by: INTERNAL MEDICINE

## 2022-01-27 PROCEDURE — 43259 EGD US EXAM DUODENUM/JEJUNUM: CPT | Performed by: INTERNAL MEDICINE

## 2022-01-27 PROCEDURE — 88305 TISSUE EXAM BY PATHOLOGIST: CPT | Performed by: PATHOLOGY

## 2022-01-27 PROCEDURE — 25000003 PHARM REV CODE 250: Performed by: INTERNAL MEDICINE

## 2022-01-27 PROCEDURE — 74329 X-RAY FOR PANCREAS ENDOSCOPY: CPT | Mod: 26,,, | Performed by: INTERNAL MEDICINE

## 2022-01-27 PROCEDURE — C2617 STENT, NON-COR, TEM W/O DEL: HCPCS | Performed by: INTERNAL MEDICINE

## 2022-01-27 PROCEDURE — 37000009 HC ANESTHESIA EA ADD 15 MINS: Performed by: INTERNAL MEDICINE

## 2022-01-27 PROCEDURE — 43259 PR ENDOSCOPIC ULTRASOUND EXAM: ICD-10-PCS | Mod: 51,,, | Performed by: INTERNAL MEDICINE

## 2022-01-27 PROCEDURE — D9220A PRA ANESTHESIA: Mod: ,,, | Performed by: STUDENT IN AN ORGANIZED HEALTH CARE EDUCATION/TRAINING PROGRAM

## 2022-01-27 PROCEDURE — 43274 PR ERCP W/STENT PLCMNT BILIARY/PANCREATIC DUCT: ICD-10-PCS | Mod: ,,, | Performed by: INTERNAL MEDICINE

## 2022-01-27 PROCEDURE — 43274 ERCP DUCT STENT PLACEMENT: CPT | Performed by: INTERNAL MEDICINE

## 2022-01-27 PROCEDURE — 27202127 HC STENT INTRODUCER: Performed by: INTERNAL MEDICINE

## 2022-01-27 PROCEDURE — 27202363 HC INJECTION AGENT, SUBMUCOSAL, ANY: Performed by: INTERNAL MEDICINE

## 2022-01-27 PROCEDURE — C1769 GUIDE WIRE: HCPCS | Performed by: INTERNAL MEDICINE

## 2022-01-27 PROCEDURE — 43254 EGD ENDO MUCOSAL RESECTION: CPT | Performed by: INTERNAL MEDICINE

## 2022-01-27 PROCEDURE — 43254 EGD ENDO MUCOSAL RESECTION: CPT | Mod: 59,,, | Performed by: INTERNAL MEDICINE

## 2022-01-27 RX ORDER — FENTANYL CITRATE 50 UG/ML
INJECTION, SOLUTION INTRAMUSCULAR; INTRAVENOUS
Status: DISCONTINUED | OUTPATIENT
Start: 2022-01-27 | End: 2022-01-27

## 2022-01-27 RX ORDER — SODIUM CHLORIDE 9 MG/ML
INJECTION, SOLUTION INTRAVENOUS CONTINUOUS
Status: DISCONTINUED | OUTPATIENT
Start: 2022-01-27 | End: 2022-01-27 | Stop reason: HOSPADM

## 2022-01-27 RX ORDER — PROCHLORPERAZINE EDISYLATE 5 MG/ML
5 INJECTION INTRAMUSCULAR; INTRAVENOUS EVERY 30 MIN PRN
Status: DISCONTINUED | OUTPATIENT
Start: 2022-01-27 | End: 2022-01-27 | Stop reason: HOSPADM

## 2022-01-27 RX ORDER — NEOSTIGMINE METHYLSULFATE 0.5 MG/ML
INJECTION, SOLUTION INTRAVENOUS
Status: DISCONTINUED | OUTPATIENT
Start: 2022-01-27 | End: 2022-01-27

## 2022-01-27 RX ORDER — PROPOFOL 10 MG/ML
VIAL (ML) INTRAVENOUS
Status: DISCONTINUED | OUTPATIENT
Start: 2022-01-27 | End: 2022-01-27

## 2022-01-27 RX ORDER — INDOMETHACIN 50 MG/1
SUPPOSITORY RECTAL
Status: COMPLETED | OUTPATIENT
Start: 2022-01-27 | End: 2022-01-27

## 2022-01-27 RX ORDER — ROCURONIUM BROMIDE 10 MG/ML
INJECTION, SOLUTION INTRAVENOUS
Status: DISCONTINUED | OUTPATIENT
Start: 2022-01-27 | End: 2022-01-27

## 2022-01-27 RX ORDER — PROPOFOL 10 MG/ML
VIAL (ML) INTRAVENOUS CONTINUOUS PRN
Status: DISCONTINUED | OUTPATIENT
Start: 2022-01-27 | End: 2022-01-27

## 2022-01-27 RX ORDER — MIDAZOLAM HYDROCHLORIDE 1 MG/ML
INJECTION INTRAMUSCULAR; INTRAVENOUS
Status: DISCONTINUED | OUTPATIENT
Start: 2022-01-27 | End: 2022-01-27

## 2022-01-27 RX ORDER — DEXAMETHASONE SODIUM PHOSPHATE 4 MG/ML
INJECTION, SOLUTION INTRA-ARTICULAR; INTRALESIONAL; INTRAMUSCULAR; INTRAVENOUS; SOFT TISSUE
Status: DISCONTINUED | OUTPATIENT
Start: 2022-01-27 | End: 2022-01-27

## 2022-01-27 RX ORDER — ONDANSETRON 2 MG/ML
4 INJECTION INTRAMUSCULAR; INTRAVENOUS DAILY PRN
Status: DISCONTINUED | OUTPATIENT
Start: 2022-01-27 | End: 2022-01-27 | Stop reason: HOSPADM

## 2022-01-27 RX ORDER — SODIUM CHLORIDE 0.9 % (FLUSH) 0.9 %
10 SYRINGE (ML) INJECTION
Status: DISCONTINUED | OUTPATIENT
Start: 2022-01-27 | End: 2022-01-27 | Stop reason: HOSPADM

## 2022-01-27 RX ORDER — ONDANSETRON 2 MG/ML
INJECTION INTRAMUSCULAR; INTRAVENOUS
Status: DISCONTINUED | OUTPATIENT
Start: 2022-01-27 | End: 2022-01-27

## 2022-01-27 RX ORDER — SCOLOPAMINE TRANSDERMAL SYSTEM 1 MG/1
1 PATCH, EXTENDED RELEASE TRANSDERMAL ONCE
Status: DISCONTINUED | OUTPATIENT
Start: 2022-01-27 | End: 2022-01-27 | Stop reason: HOSPADM

## 2022-01-27 RX ORDER — LIDOCAINE HCL/PF 100 MG/5ML
SYRINGE (ML) INTRAVENOUS
Status: DISCONTINUED | OUTPATIENT
Start: 2022-01-27 | End: 2022-01-27

## 2022-01-27 RX ADMIN — PROPOFOL 200 MG: 10 INJECTION, EMULSION INTRAVENOUS at 09:01

## 2022-01-27 RX ADMIN — GLYCOPYRROLATE 0.2 MG: 0.2 INJECTION INTRAMUSCULAR; INTRAVENOUS at 09:01

## 2022-01-27 RX ADMIN — SCOPALAMINE 1 PATCH: 1 PATCH, EXTENDED RELEASE TRANSDERMAL at 09:01

## 2022-01-27 RX ADMIN — ONDANSETRON 4 MG: 2 INJECTION INTRAMUSCULAR; INTRAVENOUS at 10:01

## 2022-01-27 RX ADMIN — Medication 100 MG: at 09:01

## 2022-01-27 RX ADMIN — DEXAMETHASONE SODIUM PHOSPHATE 8 MG: 4 INJECTION, SOLUTION INTRAMUSCULAR; INTRAVENOUS at 09:01

## 2022-01-27 RX ADMIN — MIDAZOLAM HYDROCHLORIDE 2 MG: 1 INJECTION, SOLUTION INTRAMUSCULAR; INTRAVENOUS at 09:01

## 2022-01-27 RX ADMIN — ROCURONIUM BROMIDE 50 MG: 10 INJECTION, SOLUTION INTRAVENOUS at 09:01

## 2022-01-27 RX ADMIN — NEOSTIGMINE METHYLSULFATE 5 MG: 0.5 INJECTION INTRAVENOUS at 10:01

## 2022-01-27 RX ADMIN — FENTANYL CITRATE 100 MCG: 50 INJECTION, SOLUTION INTRAMUSCULAR; INTRAVENOUS at 09:01

## 2022-01-27 RX ADMIN — PROPOFOL 175 MCG/KG/MIN: 10 INJECTION, EMULSION INTRAVENOUS at 09:01

## 2022-01-27 RX ADMIN — GLYCOPYRROLATE 0.6 MG: 0.2 INJECTION INTRAMUSCULAR; INTRAVENOUS at 10:01

## 2022-01-27 RX ADMIN — INDOMETHACIN 100 MG: 50 SUPPOSITORY RECTAL at 09:01

## 2022-01-27 RX ADMIN — IOHEXOL 10 ML: 300 INJECTION, SOLUTION INTRAVENOUS at 09:01

## 2022-01-27 RX ADMIN — SODIUM CHLORIDE: 0.9 INJECTION, SOLUTION INTRAVENOUS at 09:01

## 2022-01-27 NOTE — ANESTHESIA POSTPROCEDURE EVALUATION
Anesthesia Post Evaluation    Patient: Heidi Zhang Caro    Procedure(s) Performed: Procedure(s) (LRB):  ULTRASOUND, UPPER GI TRACT, ENDOSCOPIC (N/A)  ERCP (ENDOSCOPIC RETROGRADE CHOLANGIOPANCREATOGRAPHY) (N/A)    Final Anesthesia Type: general      Patient location during evaluation: PACU  Patient participation: Yes- Able to Participate  Level of consciousness: awake and alert  Post-procedure vital signs: reviewed and stable  Pain management: adequate  Airway patency: patent  PARVEEN mitigation strategies: Extubation while patient is awake  PONV status at discharge: No PONV  Anesthetic complications: no      Cardiovascular status: stable  Respiratory status: unassisted and spontaneous ventilation  Hydration status: euvolemic  Follow-up not needed.          Vitals Value Taken Time   /74 01/27/22 1132   Temp 36.7 °C (98 °F) 01/27/22 1145   Pulse 61 01/27/22 1145   Resp 22 01/27/22 1145   SpO2 98 % 01/27/22 1145         No case tracking events are documented in the log.      Pain/Yoni Score: Yoni Score: 9 (1/27/2022 11:00 AM)

## 2022-01-27 NOTE — TRANSFER OF CARE
"Anesthesia Transfer of Care Note    Patient: Heidi Zhang Caro    Procedure(s) Performed: Procedure(s) (LRB):  ULTRASOUND, UPPER GI TRACT, ENDOSCOPIC (N/A)  ERCP (ENDOSCOPIC RETROGRADE CHOLANGIOPANCREATOGRAPHY) (N/A)    Patient location: PACU    Anesthesia Type: general    Transport from OR: Transported from OR on room air with adequate spontaneous ventilation    Post pain: adequate analgesia    Post assessment: no apparent anesthetic complications    Post vital signs: stable    Level of consciousness: awake    Nausea/Vomiting: no nausea/vomiting    Complications: none    Transfer of care protocol was followed      Last vitals:   Visit Vitals  BP (!) 144/69 (BP Location: Left arm, Patient Position: Lying)   Pulse 94   Temp 37.1 °C (98.8 °F) (Temporal)   Resp 20   Ht 5' 2" (1.575 m)   Wt 124.7 kg (275 lb)   LMP 01/06/2022   SpO2 99%   Breastfeeding No   BMI 50.30 kg/m²     "

## 2022-01-27 NOTE — TELEPHONE ENCOUNTER
"Patient is post EGD procedure today. She is currently c/o constant "heavy" chest pain since her procedure. Advised per protocol to call 911 now. Patient VU. Advised the patient to call back with any further questions or if symptoms worsen.        Reason for Disposition   Chest pain   [1] Chest pain lasts > 5 minutes AND [2] described as crushing, pressure-like, or heavy    Additional Information   Negative: Shock suspected (e.g., cold/pale/clammy skin, too weak to stand, low BP, rapid pulse)   Negative: Difficult to awaken or acting confused (e.g., disoriented, slurred speech)   Negative: Passed out (i.e., lost consciousness, collapsed and was not responding)   Negative: Sounds like a life-threatening emergency to the triager   Negative: Breathing difficulty   Negative: SEVERE difficulty breathing (e.g., struggling for each breath, speaks in single words)   Negative: Difficult to awaken or acting confused (e.g., disoriented, slurred speech)   Negative: Shock suspected (e.g., cold/pale/clammy skin, too weak to stand, low BP, rapid pulse)   Negative: Passed out (i.e., fainted, collapsed and was not responding)   Negative: [1] Chest pain lasts > 5 minutes AND [2] age > 44   Negative: [1] Chest pain lasts > 5 minutes AND [2] age > 30 AND [3] one or more cardiac risk factors (e.g., diabetes, high blood pressure, high cholesterol, smoker, or strong family history of heart disease)   Negative: [1] Chest pain lasts > 5 minutes AND [2] history of heart disease (i.e., angina, heart attack, heart failure, bypass surgery, takes nitroglycerin)    Protocols used: ENDOSCOPY (UPPER GI) SYMPTOMS AND [id number], CHEST PAIN-A-AH      "

## 2022-01-27 NOTE — H&P
Short Stay Endoscopy History and Physical    PCP - Jad Walker MD  Referring Physician - Jim Velazquez MD  [address]  [address]  [place] [address]    Procedure - eus/ercp  ASA - per anesthesia  Mallampati - per anesthesia  History of Anesthesia problems - no  Family history Anesthesia problems -  no   Plan of anesthesia - General    HPI:  This is a 31 y.o. female here for evaluation of: duodenal adenoma    Reflux - no  Dysphagia - no  Abdominal pain - no  Diarrhea - no    ROS:  Constitutional: No fevers, chills, No weight loss  CV: No chest pain  Pulm: No cough, No shortness of breath  Ophtho: No vision changes  GI: see HPI  Derm: No rash    Medical History:  has a past medical history of Ectopic pregnancy, Endometriosis, GERD (gastroesophageal reflux disease), H/O chlamydia infection, Hiatal hernia (07/2021), IBS (irritable bowel syndrome), Insulin resistance, Kidney stone, PCOS (polycystic ovarian syndrome), and PONV (postoperative nausea and vomiting).    Surgical History:  has a past surgical history that includes Tonsillectomy; [place] tooth extraction; Diagnostic laparoscopy; Dilation and curettage of uterus; Cystoscopy w/ retrogrades (Left, 7/1/2021); Ureteroscopy (Left, 7/1/2021); Extracorporeal shock wave lithotripsy (Left, 7/1/2021); Internal urethrotomy (N/A, 7/1/2021); Laser lithotripsy (Left, 7/1/2021); Colonoscopy (N/A, 12/29/2021); and Esophagogastroduodenoscopy (N/A, 12/29/2021).    Family History: family history includes Breast cancer in her paternal aunt; Cancer in her paternal grandmother; Diabetes in her father, paternal grandfather, and paternal uncle; Heart disease in her maternal grandfather; Hypertension in her mother..    Social History:  reports that she has never smoked. She has never used smokeless tobacco. She reports current alcohol use. She reports that she does not use drugs.    Review of patient's allergies indicates:  No Known Allergies    Medications:    Facility-Administered Medications Prior to Admission   Medication Dose Route Frequency Provider Last Rate Last Admin    acetaminophen tablet 650 mg  650 mg Oral Once PRN Jad Walker MD        albuterol inhaler 2 puff  2 puff Inhalation Q20 Min PRN Jad Walker MD        diphenhydrAMINE injection 25 mg  25 mg Intravenous Once PRN Jad Walker MD        EPINEPHrine (EPIPEN) 0.3 mg/0.3 mL pen injection 0.3 mg  0.3 mg Intramuscular PRN Jad Walker MD        methylPREDNISolone sodium succinate injection 40 mg  40 mg Intravenous Once PRN Jad Walker MD        ondansetron disintegrating tablet 4 mg  4 mg Oral Once PRN Jad Walker MD        sodium chloride 0.9% 500 mL flush bag   Intravenous PRN Jad Walker MD        sodium chloride 0.9% flush 10 mL  10 mL Intravenous PRN Jad Walker MD         Medications Prior to Admission   Medication Sig Dispense Refill Last Dose    cholecalciferol, vitamin D3, (VITAMIN D3) 50 mcg (2,000 unit) Cap Take 3 capsules by mouth once daily.    Past Week at Unknown time    fluticasone (FLONASE) 50 mcg/actuation nasal spray 1 spray (50 mcg total) by Each Nare route once daily. 16 g 0 Past Month at Unknown time    lactobacillus comb no.10 (PROBIOTIC) 20 billion cell Cap Take by mouth.   Past Week at Unknown time    MAGNESIUM CITRATE ORAL Take 250 mg by mouth once daily.   Past Week at Unknown time    omega-3 fatty acids/fish oil (FISH OIL-OMEGA-3 FATTY ACIDS) 300-1,000 mg capsule Take by mouth once daily.   Past Week at Unknown time    omeprazole (PRILOSEC) 40 MG capsule Take 1 capsule (40 mg total) by mouth every morning. (Patient taking differently: Take 40 mg by mouth daily as needed.) 90 capsule 3 1/26/2022 at Unknown time    prenatal vit calc,iron,folic (PRENATAL VITAMIN ORAL) Take by mouth.   Past Week at Unknown time    TURMERIC ORAL Take by mouth.   Past Week at Unknown time    UNABLE TO FIND 1 tablet once daily. Coenzyme B6, P-5-P   Past  Week at Unknown time    UNABLE TO FIND 2,000 mg once daily. eric-inositol   Past Week at Unknown time    UNABLE TO FIND HCG injections   Past Month at Unknown time    dicyclomine (BENTYL) 20 mg tablet Take 20 mg by mouth daily as needed.   More than a month at Unknown time    magnesium oxide 400 mg magnesium Cap Take by mouth once.       progesterone (PROMETRIUM) 200 MG capsule Take 300 mg by mouth.   More than a month at Unknown time    thyroid, pork, (NATURE-THROID) 32.5 mg Tab Take by mouth.          Physical Exam:    Vital Signs:   Vitals:    01/27/22 0854   BP: (!) 144/69   Pulse: 94   Resp: 20   Temp: 98.8 °F (37.1 °C)       General Appearance: Well appearing in no acute distress    Labs:  Lab Results   Component Value Date    WBC 10.60 07/01/2021    HGB 11.7 (L) 07/01/2021    HCT 35.6 (L) 07/01/2021     07/01/2021    CHOL 193 09/04/2014    TRIG 60 09/04/2014    HDL 66 09/04/2014    ALT 23 11/04/2016    AST 18 11/04/2016     07/01/2021    K 3.7 07/01/2021     07/01/2021    CREATININE 0.76 07/01/2021    BUN 11 07/01/2021    CO2 27 07/01/2021    TSH 2.147 09/04/2014    HGBA1C 5.3 09/04/2014       I have explained the risks and benefits of this endoscopic procedure to the patient including but not limited to bleeding, inflammation, infection, perforation, and death.      Jim Velazquez MD

## 2022-01-27 NOTE — [employer]ATION PATIENT INSTRUCTIONS
Discharge Summary/Instructions after an Endoscopic Procedure  Patient Name: Heidi Haynes  Patient MRN: [id number]  Patient YOB: 1990     Thursday, January 27, 2022  Jim Velazquez MD  Dear patient,  As a result of recent federal legislation (The Federal Cures Act), you may   receive lab or pathology results from your procedure in your [facility]   account before your physician is able to contact you. Your physician or   their representative will relay the results to you with their   recommendations at their soonest availability.  Thank you,  RESTRICTIONS:  During your procedure today, you received medications for sedation.  These   medications may affect your judgment, balance and coordination.  Therefore,   for 24 hours, you have the following restrictions:   - DO NOT drive a car, operate machinery, make legal/financial decisions,   sign important papers or drink alcohol.    ACTIVITY:  Today: no heavy lifting, straining or running due to procedural   sedation/anesthesia.  The following day: return to full activity including work.  DIET:  Eat and drink normally unless instructed otherwise.     TREATMENT FOR COMMON SIDE EFFECTS:  - Mild abdominal pain, nausea, belching, bloating or excessive gas:  rest,   eat lightly and use a heating pad.  - Sore Throat: treat with throat lozenges and/or gargle with warm salt   water.  - Because air was used during the procedure, expelling large amounts of air   from your rectum or belching is normal.  - If a bowel prep was taken, you may not have a bowel movement for 1-3 days.    This is normal.  SYMPTOMS TO WATCH FOR AND REPORT TO YOUR PHYSICIAN:  1. Abdominal pain or bloating, other than gas cramps.  2. Chest pain.  3. Back pain.  4. Signs of infection such as: chills or fever occurring within 24 hours   after the procedure.  5. Rectal bleeding, which would show as bright red, maroon, or black stools.   (A tablespoon of blood from the rectum is not serious, especially if    hemorrhoids are present.)  6. Vomiting.  7. Weakness or dizziness.  GO DIRECTLY TO THE NEAREST EMERGENCY ROOM IF YOU HAVE ANY OF THE FOLLOWING:      Difficulty breathing              Chills and/or fever over 101 F   Persistent vomiting and/or vomiting blood   Severe abdominal pain   Severe chest pain   Black, tarry stools   Bleeding- more than one tablespoon   Any other symptom or condition that you feel may need urgent attention  Your doctor recommends these additional instructions:  If any biopsies were taken, your doctors clinic will contact you in 1 to 2   weeks with any results.  - Discharge patient to home.   - Resume previous diet.   - Continue present medications.   - Await path results.   - Repeat ERCP in 2 months to remove stent.  For questions, problems or results please call your physician - Jim Velazquez MD at Work:  [phone].  [facility], EMERGENCY ROOM PHONE NUMBER: [phone]  IF A COMPLICATION OR EMERGENCY SITUATION ARISES AND YOU ARE UNABLE TO REACH   YOUR PHYSICIAN - GO DIRECTLY TO THE EMERGENCY ROOM.  Jim Velazquez MD  1/27/2022 10:48:17 AM  This report has been verified and signed electronically.  Dear patient,  As a result of recent federal legislation (The Federal Cures Act), you may   receive lab or pathology results from your procedure in your [facility]   account before your physician is able to contact you. Your physician or   their representative will relay the results to you with their   recommendations at their soonest availability.  Thank you,  PROVATION

## 2022-01-27 NOTE — DISCHARGE INSTRUCTIONS
Patient Education       Endoscopic Retrograde Cholangiopancreatography Discharge Instructions   About this topic   ERCP is the short name for an endoscopic retrograde cholangiopancreatography. It is a test to check your liver, pancreas, bile ducts, and gallbladder for blockage and infection. Treatments can also be done during the procedure.  An ERCP may be used to:  · Look for the cause of your belly problem  · Get small pieces of tissue for a biopsy  · Check for blocks in the ducts  · Treat blocked ducts or remove stones in the gallbladder  · Place drain tubes or stents to lessen fluid from cysts in the pancreas     What care is needed at home?   · Ask your doctor what you need to do when you go home. Make sure you ask questions if you do not understand what the doctor says. This way you will know what you need to do.  · Drink 6 to 8 glasses of fluids each day. This will help flush the contrast out of your body.  · Do not drink beer, wine, and mixed drinks (alcohol) for at least 24 hours after the procedure.  · Do not drive the day of the procedure and for 24 hours after. Do not make legal decisions for 24 hours after the procedure.  · You may have a sore throat after the procedure and feel the need to burp for a few days after the procedure. This happens due to the scope that the doctor uses for the procedure. Avoid drinking soda to lessen the gas in your belly.  · Heat may help with belly pain. If your doctor tells you to use heat, put a heating pad on your belly for no more than 20 minutes at a time. Never go to sleep with a heating pad on as this can cause burns.  What follow-up care is needed?   · Your doctor may ask you to make visits to the office to check on your progress. Be sure to keep these visits.  · Your doctor may have you make a visit to talk about your results and any more treatment you may need. Together you can make a plan for more care.  Will physical activity be limited?   You can return to  your normal activities 24 hours after the procedure.  What problems could happen?   · Infection  · Pancreatitis  · Bleeding  · Damage to the esophagus, stomach, or small intestine  When do I need to call the doctor?   · Signs of infection. These include a fever of 100.4°F (38°C) or higher, chills.  · Trouble swallowing  · Problems breathing  · Very bad pain in the throat, chest, or belly  · Throwing up  · Bloody or dark stool  · A crunching sensation under the skin  · Severe belly swelling with hardness and tenderness  Teach Back: Helping You Understand   The Teach Back Method helps you understand the information we are giving you. After you talk with the staff, tell them in your own words what you learned. This helps to make sure the staff has described each thing clearly. It also helps to explain things that may have been confusing. Before going home, make sure you can do these:  · I can tell you about my procedure.  · I can tell you how much fluid I should drink after my procedure.  · I can tell you what I will do if I have very bad pain in my throat, chest, or belly.  Where can I learn more?   [employer]  https://www.[employer]/contents/lnuw-nmiinqdane-bgholyjkwa-cholangiopancreatography-beyond-the-basics   Last Reviewed Date   2021-04-14  Consumer Information Use and Disclaimer   This information is not specific medical advice and does not replace information you receive from your health care provider. This is only a brief summary of general information. It does NOT include all information about conditions, illnesses, injuries, tests, procedures, treatments, therapies, discharge instructions or life-style choices that may apply to you. You must talk with your health care provider for complete information about your health and treatment options. This information should not be used to decide whether or not to accept your health care providers advice, instructions or recommendations. Only your health care  provider has the knowledge and training to provide advice that is right for you.  Copyright   Copyright © 2021 [employer] and its affiliates and/or licensors. All rights reserved.  Patient Education       Endoscopic Ultrasound   Why is this procedure done?   An endoscopic ultrasound is a procedure done to look at the linings of the digestive tract, gallbladder, and pancreas. The digestive tract includes the esophagus, stomach, and the small and large intestines. This ultrasound uses sound waves to make pictures of the digestive tract. This test is done to look for:  · Abnormal growths  · Gallstones  · Irritation of an area  · Blood clots  · Evaluate stages of cancer     What will the results be?   A special doctor will look at the pictures to see if there is anything wrong. Your doctor will get the results and talk about them with you.  What happens before the procedure?   · Your doctor will take your history and do an exam.  · Talk to your doctor about:  ? All the drugs you are taking. Be sure to include all prescription, over-the-counter (OTC) drugs, and herbal supplements. Tell the doctor about any drug allergy. Bring a list of drugs you take with you.  ? Any bleeding problems. Be sure to tell your doctor if you are taking any drugs that may cause bleeding. Some of these are warfarin, rivaroxaban, apixaban, ticagrelor, clopidogrel, ketorolac, ibuprofen, naproxen, or aspirin. Certain vitamins and herbs, such as garlic and fish oil, may also add to the risk for bleeding. You may need to stop these drugs as well. Talk to your doctor about them.  · Follow your doctor's orders about eating or drinking before the test. You will be asked to stop eating or drinking a certain number of hours before the test.  · Your doctor may order a process to empty your bowel before the test.  · You will not be allowed to drive right away after the procedure. Ask a family member or a friend to drive you home.  What happens during  the procedure?   · Once you are in the procedure room, the staff will put an IV in your arm to give you fluids and drugs if needed.  · The doctor will give you drugs to help you relax and stay pain free.  · The staff will help you lie on your left side.  ? For an upper endoscope:  § The doctor will spray a drug in your throat to numb the area. You may get oxygen to help you breathe.  § The staff may place a bite-block in your mouth to keep it open during the procedure.  § The doctor will put a thin tube in your mouth and down your esophagus.  ? For a lower endoscope:  § The doctor may place the scope in your lower digestive tract through the anus.  · When the scope is in position, your doctor will look around and take pictures.  · The doctor will slide small surgical tools through the endoscope if taking a sample.  · This procedure may take 30 to 45 minutes.  What happens after the procedure?   · You will be moved to a Recovery Room after the procedure. This will give you time to wake up.  · You may feel bloated after the procedure.  · If you had a biopsy, the sample will be sent to the lab for testing. Ask your doctor when you can get the results.  · Your doctor will tell you when you can go home after the procedure.  What care is needed at home?   · Ask your doctor what you need to do when you go home. Make sure you ask questions if you do not understand what the doctor says. This way you will know what you need to do.  · Your throat may feel sore for a few days. You may rinse your mouth and throat out after the procedure. Rinse with mouthwash or salt water.  · Do not drive for 24 hours after the procedure.  · Ask your doctor when you may go back to your normal activities.  What follow-up care is needed?   · Your doctor may ask you to make visits to the office to check on your progress. Be sure to keep these visits.  · The results may help your doctor find out what is wrong. Together you can make a plan for  care.  What problems could happen?   · Injury to the esophagus  · Bleeding  · Infection  · Reaction to the sedation  · Liquid in your lung  · Sore throat  · Tear in colon lining  Last Reviewed Date   2021-04-14  Consumer Information Use and Disclaimer   This information is not specific medical advice and does not replace information you receive from your health care provider. This is only a brief summary of general information. It does NOT include all information about conditions, illnesses, injuries, tests, procedures, treatments, therapies, discharge instructions or life-style choices that may apply to you. You must talk with your health care provider for complete information about your health and treatment options. This information should not be used to decide whether or not to accept your health care providers advice, instructions or recommendations. Only your health care provider has the knowledge and training to provide advice that is right for you.  Copyright   Copyright © 2021 [employer] and its affiliates and/or licensors. All rights reserved.

## 2022-01-27 NOTE — ANESTHESIA PREPROCEDURE EVALUATION
01/27/2022  Heidi Zhang Caro is a 31 y.o., female.    Anesthesia Evaluation    I have reviewed the Patient Summary Reports.    I have reviewed the Nursing Notes. I have reviewed the NPO Status.      Review of Systems  Anesthesia Hx:  Hx of Anesthetic complications PONV History of prior surgery of interest to airway management or planning: Personal Hx of Anesthesia complications, Post-Operative Nausea/Vomiting   Cardiovascular:   Exercise tolerance: good    Renal/:   Chronic Renal Disease    Hepatic/GI:   Hiatal Hernia, GERD    Endocrine:  Metabolic Disorders, Morbid Obesity / BMI > 40      Physical Exam  General:  Well nourished, Morbid Obesity    Airway/Jaw/Neck:  Airway Findings: Mouth Opening: Normal Tongue: Normal  General Airway Assessment: Adult  Mallampati: II  TM Distance: Normal, at least 6 cm      Dental:  Dental Findings: In tact   Chest/Lungs:  Chest/Lungs Clear    Heart/Vascular:  Heart Findings: Normal            Anesthesia Plan  Type of Anesthesia, risks & benefits discussed:  Anesthesia Type:  general    Patient's Preference:   Plan Factors:          Intra-op Monitoring Plan: standard ASA monitors  Intra-op Monitoring Plan Comments:   Post Op Pain Control Plan: multimodal analgesia, IV/PO Opioids PRN and per primary service following discharge from PACU  Post Op Pain Control Plan Comments:     Induction:   IV  Beta Blocker:  Patient is not currently on a Beta-Blocker (No further documentation required).       Informed Consent: Patient understands risks and agrees with Anesthesia plan.  Questions answered.   ASA Score: 3     Day of Surgery Review of History & Physical:            Ready For Surgery From Anesthesia Perspective.

## 2022-01-27 NOTE — ANESTHESIA PROCEDURE NOTES
Intubation    Date/Time: 1/27/2022 9:25 AM  Performed by: Raf Nathan CRNA  Authorized by: Obinna Arce MD     Intubation:     Induction:  Intravenous    Intubated:  Postinduction    Mask Ventilation:  Easy mask    Attempts:  1    Attempted By:  CRNA    Method of Intubation:  Video laryngoscopy    Blade:  Sheikh 3    Laryngeal View Grade: Grade I - full view of cords      Difficult Airway Encountered?: No      Complications:  None    Airway Device:  Oral endotracheal tube    Airway Device Size:  7.0    Style/Cuff Inflation:  Cuffed    Inflation Amount (mL):  10    Tube secured:  22    Secured at:  The lips    Placement Verified By:  Capnometry and Revisualization with laryngoscopy    Complicating Factors:  Small mouth, obesity and short neck    Findings Post-Intubation:  BS equal bilateral and atraumatic/condition of teeth unchanged

## 2022-01-27 NOTE — PROVATION PATIENT INSTRUCTIONS
Discharge Summary/Instructions after an Endoscopic Procedure  Patient Name: Heidi Haynes  Patient MRN: [id number]  Patient YOB: 1990     Thursday, January 27, 2022  Jim Velazquez MD  Dear patient,  As a result of recent federal legislation (The Federal Cures Act), you may   receive lab or pathology results from your procedure in your [facility]   account before your physician is able to contact you. Your physician or   their representative will relay the results to you with their   recommendations at their soonest availability.  Thank you,  RESTRICTIONS:  During your procedure today, you received medications for sedation.  These   medications may affect your judgment, balance and coordination.  Therefore,   for 24 hours, you have the following restrictions:   - DO NOT drive a car, operate machinery, make legal/financial decisions,   sign important papers or drink alcohol.    ACTIVITY:  Today: no heavy lifting, straining or running due to procedural   sedation/anesthesia.  The following day: return to full activity including work.  DIET:  Eat and drink normally unless instructed otherwise.     TREATMENT FOR COMMON SIDE EFFECTS:  - Mild abdominal pain, nausea, belching, bloating or excessive gas:  rest,   eat lightly and use a heating pad.  - Sore Throat: treat with throat lozenges and/or gargle with warm salt   water.  - Because air was used during the procedure, expelling large amounts of air   from your rectum or belching is normal.  - If a bowel prep was taken, you may not have a bowel movement for 1-3 days.    This is normal.  SYMPTOMS TO WATCH FOR AND REPORT TO YOUR PHYSICIAN:  1. Abdominal pain or bloating, other than gas cramps.  2. Chest pain.  3. Back pain.  4. Signs of infection such as: chills or fever occurring within 24 hours   after the procedure.  5. Rectal bleeding, which would show as bright red, maroon, or black stools.   (A tablespoon of blood from the rectum is not serious, especially if    hemorrhoids are present.)  6. Vomiting.  7. Weakness or dizziness.  GO DIRECTLY TO THE NEAREST EMERGENCY ROOM IF YOU HAVE ANY OF THE FOLLOWING:      Difficulty breathing              Chills and/or fever over 101 F   Persistent vomiting and/or vomiting blood   Severe abdominal pain   Severe chest pain   Black, tarry stools   Bleeding- more than one tablespoon   Any other symptom or condition that you feel may need urgent attention  Your doctor recommends these additional instructions:  If any biopsies were taken, your doctors clinic will contact you in 1 to 2   weeks with any results.  - Discharge patient to home.   - Resume previous diet.   - Continue present medications.   - Perform an ERCP today.  For questions, problems or results please call your physician - Jim Velazquez MD at Work:  [phone].  [facility] [place], EMERGENCY ROOM PHONE NUMBER: [phone]  IF A COMPLICATION OR EMERGENCY SITUATION ARISES AND YOU ARE UNABLE TO REACH   YOUR PHYSICIAN - GO DIRECTLY TO THE EMERGENCY ROOM.  Jim Velazquez MD  1/27/2022 10:38:02 AM  This report has been verified and signed electronically.  Dear patient,  As a result of recent federal legislation (The Federal Cures Act), you may   receive lab or pathology results from your procedure in your [facility]   account before your physician is able to contact you. Your physician or   their representative will relay the results to you with their   recommendations at their soonest availability.  Thank you,  [employer]

## 2022-01-28 ENCOUNTER — TELEPHONE (OUTPATIENT)
Dept: ENDOSCOPY | Facility: HOSPITAL | Age: 32
End: 2022-01-28
Payer: COMMERCIAL

## 2022-01-28 PROBLEM — K85.90 ACUTE PANCREATITIS: Status: ACTIVE | Noted: 2022-01-28

## 2022-01-28 PROBLEM — E03.9 HYPOTHYROIDISM: Status: ACTIVE | Noted: 2022-01-28

## 2022-01-28 PROBLEM — Z98.890 S/P ERCP: Status: ACTIVE | Noted: 2022-01-28

## 2022-01-28 NOTE — TELEPHONE ENCOUNTER
----- Message from Brian Maldonado sent at 1/28/2022  7:35 AM CST -----  Type: Patient Call Back    Who called:Kaden    What is the request in detail: pt is currently at the ED in [place].. states pt is trying to get transfer to [facility] for pancreatitis     Can the clinic reply by [facility]?    Would the patient rather a call back or a response via [facility]? Call    Best call back number:.[phone]

## 2022-01-31 ENCOUNTER — TELEPHONE (OUTPATIENT)
Dept: ENDOSCOPY | Facility: HOSPITAL | Age: 32
End: 2022-01-31
Payer: COMMERCIAL

## 2022-01-31 NOTE — TELEPHONE ENCOUNTER
----- Message from Mart Angulo sent at 1/31/2022  3:32 PM CST -----  Regarding: hosp f/u appt  Name of Who is Calling: PETR LEVY [[id number]]           What is the request in detail: Pt states she has been waiting on a call back from staff in regards to hosp f/u, she is currently in hospital and should be released tomorrow on how things will go tonight, would like a call back from nurse/staff. Pl advise           Can the clinic reply by [facility]: no           What Number to Call Back if not in [facility]: [phone]

## 2022-02-01 ENCOUNTER — TELEPHONE (OUTPATIENT)
Dept: ENDOSCOPY | Facility: HOSPITAL | Age: 32
End: 2022-02-01
Payer: COMMERCIAL

## 2022-02-01 NOTE — TELEPHONE ENCOUNTER
----- Message from Carlos Boyle sent at 2/1/2022  1:42 PM CST -----  Contact: [phone]  Pt returning missed call from Rosa Isela. Pt would like a call back.    [phone]

## 2022-02-01 NOTE — TELEPHONE ENCOUNTER
----- Message from Karli Alex sent at 2/1/2022 11:31 AM CST -----  Type:  Patient Call Back    Who Called:       What is the reqeust in detail: Pt  is requesting a call back to schedule an hospital f/u pt was admitted to the Er in [place] and was told to f/u with Dr. Leblanc. No solution found in template. Please Advise     Can the clinic reply by [facility]?    Best Call Back Number: [phone]

## 2022-02-01 NOTE — TELEPHONE ENCOUNTER
MD Rosa Isela Kuo MA  Caller: Unspecified (Yesterday,  4:07 PM)  Can you add her to clinic list / virtual visit while I'm at [facility] message

## 2022-02-02 LAB
FINAL PATHOLOGIC DIAGNOSIS: NORMAL
GROSS: NORMAL
Lab: NORMAL

## 2022-02-04 ENCOUNTER — TELEPHONE (OUTPATIENT)
Dept: GASTROENTEROLOGY | Facility: HOSPITAL | Age: 32
End: 2022-02-04
Payer: COMMERCIAL

## 2022-02-04 NOTE — TELEPHONE ENCOUNTER
I relayed the biopsy results to her.  We went over her procedure that resulted in her pancreatitis and plans for stent removal.  She's concerned she will get pancreatitis with the stent removal. It's a risk, but a small one.  She has follow up with Dr Avilez in the short term

## 2022-03-02 ENCOUNTER — TELEPHONE (OUTPATIENT)
Dept: ENDOSCOPY | Facility: HOSPITAL | Age: 32
End: 2022-03-02
Payer: COMMERCIAL

## 2022-03-02 ENCOUNTER — PATIENT MESSAGE (OUTPATIENT)
Dept: ENDOSCOPY | Facility: HOSPITAL | Age: 32
End: 2022-03-02
Payer: COMMERCIAL

## 2022-03-02 DIAGNOSIS — K83.1 BILIARY STRICTURE: Primary | ICD-10-CM

## 2022-03-02 NOTE — TELEPHONE ENCOUNTER
----- Message from Shona Leo sent at 3/2/2022  3:24 PM CST -----  Type:  Needs Medical Advice    Who Called: pt  Symptoms (please be specific):  would like to get a call back to schedule visit and would like to discuss discomfort she is having with Stent    Would the patient rather a call back or a response via [email or web address]?  Call  Best Call Back Number:  [phone]  Additional Information:

## 2022-03-03 ENCOUNTER — TELEPHONE (OUTPATIENT)
Dept: ENDOSCOPY | Facility: HOSPITAL | Age: 32
End: 2022-03-03
Payer: COMMERCIAL

## 2022-03-03 ENCOUNTER — PATIENT MESSAGE (OUTPATIENT)
Dept: ENDOSCOPY | Facility: HOSPITAL | Age: 32
End: 2022-03-03
Payer: COMMERCIAL

## 2022-03-03 DIAGNOSIS — D13.5 AMPULLARY ADENOMA: Primary | ICD-10-CM

## 2022-03-04 ENCOUNTER — ANESTHESIA EVENT (OUTPATIENT)
Dept: ENDOSCOPY | Facility: HOSPITAL | Age: 32
DRG: 439 | End: 2022-03-04
Payer: COMMERCIAL

## 2022-03-04 ENCOUNTER — ANESTHESIA (OUTPATIENT)
Dept: ENDOSCOPY | Facility: HOSPITAL | Age: 32
DRG: 439 | End: 2022-03-04
Payer: COMMERCIAL

## 2022-03-04 ENCOUNTER — HOSPITAL ENCOUNTER (INPATIENT)
Facility: HOSPITAL | Age: 32
LOS: 1 days | Discharge: HOME | DRG: 439 | End: 2022-03-05
Attending: EMERGENCY MEDICINE | Admitting: INTERNAL MEDICINE
Payer: COMMERCIAL

## 2022-03-04 DIAGNOSIS — K85.90 PANCREATITIS: ICD-10-CM

## 2022-03-04 DIAGNOSIS — R07.9 CHEST PAIN: ICD-10-CM

## 2022-03-04 DIAGNOSIS — Z96.89 PRESENCE OF PANCREATIC DUCT STENT: ICD-10-CM

## 2022-03-04 DIAGNOSIS — K85.90 ACUTE PANCREATITIS, UNSPECIFIED COMPLICATION STATUS, UNSPECIFIED PANCREATITIS TYPE: Primary | ICD-10-CM

## 2022-03-04 DIAGNOSIS — Z98.890 HISTORY OF BILIARY DUCT STENT PLACEMENT: ICD-10-CM

## 2022-03-04 PROBLEM — K21.9 GERD (GASTROESOPHAGEAL REFLUX DISEASE): Status: ACTIVE | Noted: 2022-03-04

## 2022-03-04 PROBLEM — K58.0 IRRITABLE BOWEL SYNDROME WITH DIARRHEA: Status: ACTIVE | Noted: 2022-03-04

## 2022-03-04 PROBLEM — K58.9 IBS (IRRITABLE BOWEL SYNDROME): Status: ACTIVE | Noted: 2022-03-04

## 2022-03-04 LAB
ALBUMIN SERPL BCP-MCNC: 3.7 G/DL (ref 3.5–5.2)
ALP SERPL-CCNC: 136 U/L (ref 55–135)
ALT SERPL W/O P-5'-P-CCNC: 23 U/L (ref 10–44)
ANION GAP SERPL CALC-SCNC: 13 MMOL/L (ref 8–16)
AST SERPL-CCNC: 17 U/L (ref 10–40)
B-HCG UR QL: NEGATIVE
BACTERIA #/AREA URNS AUTO: ABNORMAL /HPF
BASOPHILS # BLD AUTO: 0.04 K/UL (ref 0–0.2)
BASOPHILS NFR BLD: 0.3 % (ref 0–1.9)
BILIRUB SERPL-MCNC: 0.7 MG/DL (ref 0.1–1)
BILIRUB UR QL STRIP: NEGATIVE
BUN SERPL-MCNC: 11 MG/DL (ref 6–20)
CALCIUM SERPL-MCNC: 9.7 MG/DL (ref 8.7–10.5)
CHLORIDE SERPL-SCNC: 105 MMOL/L (ref 95–110)
CLARITY UR REFRACT.AUTO: CLEAR
CO2 SERPL-SCNC: 22 MMOL/L (ref 23–29)
COLOR UR AUTO: YELLOW
CREAT SERPL-MCNC: 0.6 MG/DL (ref 0.5–1.4)
CTP QC/QA: YES
CTP QC/QA: YES
DIFFERENTIAL METHOD: ABNORMAL
EOSINOPHIL # BLD AUTO: 0.2 K/UL (ref 0–0.5)
EOSINOPHIL NFR BLD: 1.3 % (ref 0–8)
ERYTHROCYTE [DISTWIDTH] IN BLOOD BY AUTOMATED COUNT: 14.2 % (ref 11.5–14.5)
EST. GFR  (AFRICAN AMERICAN): >60 ML/MIN/1.73 M^2
EST. GFR  (NON AFRICAN AMERICAN): >60 ML/MIN/1.73 M^2
GLUCOSE SERPL-MCNC: 105 MG/DL (ref 70–110)
GLUCOSE UR QL STRIP: NEGATIVE
HCT VFR BLD AUTO: 38.8 % (ref 37–48.5)
HGB BLD-MCNC: 12.3 G/DL (ref 12–16)
HGB UR QL STRIP: ABNORMAL
IMM GRANULOCYTES # BLD AUTO: 0.03 K/UL (ref 0–0.04)
IMM GRANULOCYTES NFR BLD AUTO: 0.2 % (ref 0–0.5)
KETONES UR QL STRIP: ABNORMAL
LEUKOCYTE ESTERASE UR QL STRIP: NEGATIVE
LIPASE SERPL-CCNC: 987 U/L (ref 4–60)
LYMPHOCYTES # BLD AUTO: 2.5 K/UL (ref 1–4.8)
LYMPHOCYTES NFR BLD: 18.7 % (ref 18–48)
MCH RBC QN AUTO: 26.3 PG (ref 27–31)
MCHC RBC AUTO-ENTMCNC: 31.7 G/DL (ref 32–36)
MCV RBC AUTO: 83 FL (ref 82–98)
MICROSCOPIC COMMENT: ABNORMAL
MONOCYTES # BLD AUTO: 0.9 K/UL (ref 0.3–1)
MONOCYTES NFR BLD: 6.4 % (ref 4–15)
NEUTROPHILS # BLD AUTO: 9.9 K/UL (ref 1.8–7.7)
NEUTROPHILS NFR BLD: 73.1 % (ref 38–73)
NITRITE UR QL STRIP: NEGATIVE
NRBC BLD-RTO: 0 /100 WBC
PH UR STRIP: 6 [PH] (ref 5–8)
PLATELET # BLD AUTO: 311 K/UL (ref 150–450)
PMV BLD AUTO: 9.7 FL (ref 9.2–12.9)
POTASSIUM SERPL-SCNC: 4.3 MMOL/L (ref 3.5–5.1)
PROT SERPL-MCNC: 7.6 G/DL (ref 6–8.4)
PROT UR QL STRIP: NEGATIVE
RBC # BLD AUTO: 4.68 M/UL (ref 4–5.4)
RBC #/AREA URNS AUTO: 7 /HPF (ref 0–4)
SARS-COV-2 RDRP RESP QL NAA+PROBE: NEGATIVE
SODIUM SERPL-SCNC: 140 MMOL/L (ref 136–145)
SP GR UR STRIP: 1.02 (ref 1–1.03)
SQUAMOUS #/AREA URNS AUTO: 1 /HPF
TRIGL SERPL-MCNC: 66 MG/DL (ref 30–150)
URN SPEC COLLECT METH UR: ABNORMAL
WBC # BLD AUTO: 13.5 K/UL (ref 3.9–12.7)
WBC #/AREA URNS AUTO: 0 /HPF (ref 0–5)

## 2022-03-04 PROCEDURE — 25500020 PHARM REV CODE 255: Performed by: EMERGENCY MEDICINE

## 2022-03-04 PROCEDURE — 37000008 HC ANESTHESIA 1ST 15 MINUTES: Performed by: INTERNAL MEDICINE

## 2022-03-04 PROCEDURE — 93010 ELECTROCARDIOGRAM REPORT: CPT | Mod: ,,, | Performed by: INTERNAL MEDICINE

## 2022-03-04 PROCEDURE — 43247 PR EGD, FLEX, W/REMOVAL, FOREIGN BODY: ICD-10-PCS | Mod: ,,, | Performed by: INTERNAL MEDICINE

## 2022-03-04 PROCEDURE — 37000009 HC ANESTHESIA EA ADD 15 MINS: Performed by: INTERNAL MEDICINE

## 2022-03-04 PROCEDURE — 99223 1ST HOSP IP/OBS HIGH 75: CPT | Mod: 25,,, | Performed by: INTERNAL MEDICINE

## 2022-03-04 PROCEDURE — 63600175 PHARM REV CODE 636 W HCPCS: Performed by: PHYSICIAN ASSISTANT

## 2022-03-04 PROCEDURE — 25000003 PHARM REV CODE 250

## 2022-03-04 PROCEDURE — U0002 COVID-19 LAB TEST NON-CDC: HCPCS | Performed by: PHYSICIAN ASSISTANT

## 2022-03-04 PROCEDURE — 83690 ASSAY OF LIPASE: CPT | Performed by: PHYSICIAN ASSISTANT

## 2022-03-04 PROCEDURE — 93010 EKG 12-LEAD: ICD-10-PCS | Mod: ,,, | Performed by: INTERNAL MEDICINE

## 2022-03-04 PROCEDURE — 81001 URINALYSIS AUTO W/SCOPE: CPT | Performed by: PHYSICIAN ASSISTANT

## 2022-03-04 PROCEDURE — 99285 PR EMERGENCY DEPT VISIT,LEVEL V: ICD-10-PCS | Mod: CS,,, | Performed by: PHYSICIAN ASSISTANT

## 2022-03-04 PROCEDURE — 99285 EMERGENCY DEPT VISIT HI MDM: CPT | Mod: 25

## 2022-03-04 PROCEDURE — D9220A PRA ANESTHESIA: ICD-10-PCS | Mod: ,,, | Performed by: NURSE ANESTHETIST, CERTIFIED REGISTERED

## 2022-03-04 PROCEDURE — 63600175 PHARM REV CODE 636 W HCPCS: Performed by: STUDENT IN AN ORGANIZED HEALTH CARE EDUCATION/TRAINING PROGRAM

## 2022-03-04 PROCEDURE — 27201089 HC SNARE, DISP (ANY): Performed by: INTERNAL MEDICINE

## 2022-03-04 PROCEDURE — 96361 HYDRATE IV INFUSION ADD-ON: CPT

## 2022-03-04 PROCEDURE — 80053 COMPREHEN METABOLIC PANEL: CPT | Performed by: PHYSICIAN ASSISTANT

## 2022-03-04 PROCEDURE — 99223 PR INITIAL HOSPITAL CARE,LEVL III: ICD-10-PCS | Mod: ,,, | Performed by: INTERNAL MEDICINE

## 2022-03-04 PROCEDURE — 63600175 PHARM REV CODE 636 W HCPCS: Performed by: NURSE ANESTHETIST, CERTIFIED REGISTERED

## 2022-03-04 PROCEDURE — 25000003 PHARM REV CODE 250: Performed by: PHYSICIAN ASSISTANT

## 2022-03-04 PROCEDURE — 99285 EMERGENCY DEPT VISIT HI MDM: CPT | Mod: CS,,, | Performed by: PHYSICIAN ASSISTANT

## 2022-03-04 PROCEDURE — D9220A PRA ANESTHESIA: Mod: ,,, | Performed by: NURSE ANESTHETIST, CERTIFIED REGISTERED

## 2022-03-04 PROCEDURE — 25000003 PHARM REV CODE 250: Performed by: ANESTHESIOLOGY

## 2022-03-04 PROCEDURE — 43247 EGD REMOVE FOREIGN BODY: CPT | Mod: ,,, | Performed by: INTERNAL MEDICINE

## 2022-03-04 PROCEDURE — 25000003 PHARM REV CODE 250: Performed by: INTERNAL MEDICINE

## 2022-03-04 PROCEDURE — 96374 THER/PROPH/DIAG INJ IV PUSH: CPT

## 2022-03-04 PROCEDURE — 84478 ASSAY OF TRIGLYCERIDES: CPT | Performed by: PHYSICIAN ASSISTANT

## 2022-03-04 PROCEDURE — 63600175 PHARM REV CODE 636 W HCPCS

## 2022-03-04 PROCEDURE — D9220A PRA ANESTHESIA: Mod: ,,, | Performed by: ANESTHESIOLOGY

## 2022-03-04 PROCEDURE — 63600175 PHARM REV CODE 636 W HCPCS: Performed by: INTERNAL MEDICINE

## 2022-03-04 PROCEDURE — D9220A PRA ANESTHESIA: ICD-10-PCS | Mod: ,,, | Performed by: ANESTHESIOLOGY

## 2022-03-04 PROCEDURE — 99223 PR INITIAL HOSPITAL CARE,LEVL III: ICD-10-PCS | Mod: 25,,, | Performed by: INTERNAL MEDICINE

## 2022-03-04 PROCEDURE — 96375 TX/PRO/DX INJ NEW DRUG ADDON: CPT

## 2022-03-04 PROCEDURE — 25000003 PHARM REV CODE 250: Performed by: NURSE ANESTHETIST, CERTIFIED REGISTERED

## 2022-03-04 PROCEDURE — 93005 ELECTROCARDIOGRAM TRACING: CPT

## 2022-03-04 PROCEDURE — 99223 1ST HOSP IP/OBS HIGH 75: CPT | Mod: ,,, | Performed by: INTERNAL MEDICINE

## 2022-03-04 PROCEDURE — 85025 COMPLETE CBC W/AUTO DIFF WBC: CPT | Performed by: PHYSICIAN ASSISTANT

## 2022-03-04 PROCEDURE — 81025 URINE PREGNANCY TEST: CPT | Performed by: EMERGENCY MEDICINE

## 2022-03-04 PROCEDURE — 11000001 HC ACUTE MED/SURG PRIVATE ROOM

## 2022-03-04 RX ORDER — SCOLOPAMINE TRANSDERMAL SYSTEM 1 MG/1
PATCH, EXTENDED RELEASE TRANSDERMAL
Status: DISPENSED
Start: 2022-03-04 | End: 2022-03-05

## 2022-03-04 RX ORDER — SODIUM CHLORIDE 0.9 % (FLUSH) 0.9 %
10 SYRINGE (ML) INJECTION
Status: DISCONTINUED | OUTPATIENT
Start: 2022-03-04 | End: 2022-03-05 | Stop reason: HOSPADM

## 2022-03-04 RX ORDER — FENTANYL CITRATE 50 UG/ML
INJECTION, SOLUTION INTRAMUSCULAR; INTRAVENOUS
Status: DISCONTINUED | OUTPATIENT
Start: 2022-03-04 | End: 2022-03-04

## 2022-03-04 RX ORDER — PROPOFOL 10 MG/ML
VIAL (ML) INTRAVENOUS CONTINUOUS PRN
Status: DISCONTINUED | OUTPATIENT
Start: 2022-03-04 | End: 2022-03-04

## 2022-03-04 RX ORDER — IBUPROFEN 200 MG
24 TABLET ORAL
Status: DISCONTINUED | OUTPATIENT
Start: 2022-03-04 | End: 2022-03-05 | Stop reason: HOSPADM

## 2022-03-04 RX ORDER — HYDROCODONE BITARTRATE AND ACETAMINOPHEN 10; 325 MG/1; MG/1
1 TABLET ORAL
Status: ON HOLD | COMMUNITY
End: 2022-03-05 | Stop reason: HOSPADM

## 2022-03-04 RX ORDER — MORPHINE SULFATE 4 MG/ML
4 INJECTION, SOLUTION INTRAMUSCULAR; INTRAVENOUS
Status: COMPLETED | OUTPATIENT
Start: 2022-03-04 | End: 2022-03-04

## 2022-03-04 RX ORDER — ONDANSETRON 2 MG/ML
4 INJECTION INTRAMUSCULAR; INTRAVENOUS EVERY 6 HOURS PRN
Status: DISCONTINUED | OUTPATIENT
Start: 2022-03-04 | End: 2022-03-05 | Stop reason: HOSPADM

## 2022-03-04 RX ORDER — LIDOCAINE HCL/PF 100 MG/5ML
SYRINGE (ML) INTRAVENOUS
Status: DISCONTINUED | OUTPATIENT
Start: 2022-03-04 | End: 2022-03-04

## 2022-03-04 RX ORDER — HYDROMORPHONE HYDROCHLORIDE 1 MG/ML
1 INJECTION, SOLUTION INTRAMUSCULAR; INTRAVENOUS; SUBCUTANEOUS EVERY 6 HOURS PRN
Status: DISCONTINUED | OUTPATIENT
Start: 2022-03-04 | End: 2022-03-05 | Stop reason: HOSPADM

## 2022-03-04 RX ORDER — SODIUM CHLORIDE 0.9 % (FLUSH) 0.9 %
3 SYRINGE (ML) INJECTION
Status: DISCONTINUED | OUTPATIENT
Start: 2022-03-04 | End: 2022-03-05 | Stop reason: HOSPADM

## 2022-03-04 RX ORDER — KETAMINE HCL IN 0.9 % NACL 50 MG/5 ML
SYRINGE (ML) INTRAVENOUS
Status: DISCONTINUED | OUTPATIENT
Start: 2022-03-04 | End: 2022-03-04

## 2022-03-04 RX ORDER — HALOPERIDOL 5 MG/ML
0.5 INJECTION INTRAMUSCULAR EVERY 10 MIN PRN
Status: DISCONTINUED | OUTPATIENT
Start: 2022-03-04 | End: 2022-03-04 | Stop reason: HOSPADM

## 2022-03-04 RX ORDER — SCOLOPAMINE TRANSDERMAL SYSTEM 1 MG/1
1 PATCH, EXTENDED RELEASE TRANSDERMAL
Status: DISCONTINUED | OUTPATIENT
Start: 2022-03-04 | End: 2022-03-05 | Stop reason: HOSPADM

## 2022-03-04 RX ORDER — ENOXAPARIN SODIUM 100 MG/ML
40 INJECTION SUBCUTANEOUS EVERY 12 HOURS
Status: DISCONTINUED | OUTPATIENT
Start: 2022-03-04 | End: 2022-03-05 | Stop reason: HOSPADM

## 2022-03-04 RX ORDER — PROPOFOL 10 MG/ML
VIAL (ML) INTRAVENOUS
Status: DISCONTINUED | OUTPATIENT
Start: 2022-03-04 | End: 2022-03-04

## 2022-03-04 RX ORDER — ACETAMINOPHEN 325 MG/1
650 TABLET ORAL EVERY 8 HOURS PRN
Status: DISCONTINUED | OUTPATIENT
Start: 2022-03-04 | End: 2022-03-05 | Stop reason: HOSPADM

## 2022-03-04 RX ORDER — FAMOTIDINE 10 MG/ML
20 INJECTION INTRAVENOUS
Status: COMPLETED | OUTPATIENT
Start: 2022-03-04 | End: 2022-03-04

## 2022-03-04 RX ORDER — SODIUM CHLORIDE, SODIUM LACTATE, POTASSIUM CHLORIDE, CALCIUM CHLORIDE 600; 310; 30; 20 MG/100ML; MG/100ML; MG/100ML; MG/100ML
INJECTION, SOLUTION INTRAVENOUS CONTINUOUS
Status: DISCONTINUED | OUTPATIENT
Start: 2022-03-04 | End: 2022-03-05 | Stop reason: HOSPADM

## 2022-03-04 RX ORDER — MORPHINE SULFATE 4 MG/ML
4 INJECTION, SOLUTION INTRAMUSCULAR; INTRAVENOUS EVERY 4 HOURS PRN
Status: DISCONTINUED | OUTPATIENT
Start: 2022-03-04 | End: 2022-03-04

## 2022-03-04 RX ORDER — IBUPROFEN 200 MG
16 TABLET ORAL
Status: DISCONTINUED | OUTPATIENT
Start: 2022-03-04 | End: 2022-03-05 | Stop reason: HOSPADM

## 2022-03-04 RX ORDER — HYDROMORPHONE HYDROCHLORIDE 1 MG/ML
1 INJECTION, SOLUTION INTRAMUSCULAR; INTRAVENOUS; SUBCUTANEOUS ONCE AS NEEDED
Status: COMPLETED | OUTPATIENT
Start: 2022-03-04 | End: 2022-03-04

## 2022-03-04 RX ORDER — GLUCAGON 1 MG
1 KIT INJECTION
Status: DISCONTINUED | OUTPATIENT
Start: 2022-03-04 | End: 2022-03-05 | Stop reason: HOSPADM

## 2022-03-04 RX ORDER — NALOXONE HCL 0.4 MG/ML
0.02 VIAL (ML) INJECTION
Status: DISCONTINUED | OUTPATIENT
Start: 2022-03-04 | End: 2022-03-05 | Stop reason: HOSPADM

## 2022-03-04 RX ORDER — PROCHLORPERAZINE EDISYLATE 5 MG/ML
5 INJECTION INTRAMUSCULAR; INTRAVENOUS EVERY 30 MIN PRN
Status: DISCONTINUED | OUTPATIENT
Start: 2022-03-04 | End: 2022-03-04 | Stop reason: HOSPADM

## 2022-03-04 RX ORDER — MIDAZOLAM HYDROCHLORIDE 1 MG/ML
INJECTION INTRAMUSCULAR; INTRAVENOUS
Status: DISCONTINUED | OUTPATIENT
Start: 2022-03-04 | End: 2022-03-04

## 2022-03-04 RX ORDER — THYROID 30 MG/1
30 TABLET ORAL DAILY
Status: DISCONTINUED | OUTPATIENT
Start: 2022-03-05 | End: 2022-03-05

## 2022-03-04 RX ORDER — OXYCODONE HYDROCHLORIDE 5 MG/1
5 TABLET ORAL EVERY 4 HOURS PRN
Status: DISCONTINUED | OUTPATIENT
Start: 2022-03-04 | End: 2022-03-05 | Stop reason: HOSPADM

## 2022-03-04 RX ORDER — PROCHLORPERAZINE EDISYLATE 5 MG/ML
5 INJECTION INTRAMUSCULAR; INTRAVENOUS EVERY 6 HOURS PRN
Status: DISCONTINUED | OUTPATIENT
Start: 2022-03-04 | End: 2022-03-05 | Stop reason: HOSPADM

## 2022-03-04 RX ORDER — KETOROLAC TROMETHAMINE 10 MG/1
10 TABLET, FILM COATED ORAL EVERY 6 HOURS
Status: ON HOLD | COMMUNITY
End: 2022-03-05 | Stop reason: HOSPADM

## 2022-03-04 RX ORDER — METOCLOPRAMIDE HYDROCHLORIDE 5 MG/ML
5 INJECTION INTRAMUSCULAR; INTRAVENOUS
Status: COMPLETED | OUTPATIENT
Start: 2022-03-04 | End: 2022-03-04

## 2022-03-04 RX ADMIN — SODIUM CHLORIDE: 0.9 INJECTION, SOLUTION INTRAVENOUS at 02:03

## 2022-03-04 RX ADMIN — RIFAXIMIN 550 MG: 550 TABLET ORAL at 09:03

## 2022-03-04 RX ADMIN — HYDROMORPHONE HYDROCHLORIDE 1 MG: 1 INJECTION, SOLUTION INTRAMUSCULAR; INTRAVENOUS; SUBCUTANEOUS at 10:03

## 2022-03-04 RX ADMIN — PROPOFOL 50 MG: 10 INJECTION, EMULSION INTRAVENOUS at 02:03

## 2022-03-04 RX ADMIN — SODIUM CHLORIDE 1000 ML: 0.9 INJECTION, SOLUTION INTRAVENOUS at 08:03

## 2022-03-04 RX ADMIN — SCOPALAMINE 1 PATCH: 1 PATCH, EXTENDED RELEASE TRANSDERMAL at 02:03

## 2022-03-04 RX ADMIN — ONDANSETRON 4 MG: 2 INJECTION INTRAMUSCULAR; INTRAVENOUS at 11:03

## 2022-03-04 RX ADMIN — Medication 20 MG: at 02:03

## 2022-03-04 RX ADMIN — GLYCOPYRROLATE 0.2 MG: 0.2 INJECTION, SOLUTION INTRAMUSCULAR; INTRAVITREAL at 02:03

## 2022-03-04 RX ADMIN — FENTANYL CITRATE 50 MCG: 50 INJECTION, SOLUTION INTRAMUSCULAR; INTRAVENOUS at 02:03

## 2022-03-04 RX ADMIN — SODIUM CHLORIDE, SODIUM LACTATE, POTASSIUM CHLORIDE, AND CALCIUM CHLORIDE: .6; .31; .03; .02 INJECTION, SOLUTION INTRAVENOUS at 11:03

## 2022-03-04 RX ADMIN — IOHEXOL 100 ML: 350 INJECTION, SOLUTION INTRAVENOUS at 07:03

## 2022-03-04 RX ADMIN — METOCLOPRAMIDE 5 MG: 5 INJECTION, SOLUTION INTRAMUSCULAR; INTRAVENOUS at 08:03

## 2022-03-04 RX ADMIN — PROPOFOL 150 MCG/KG/MIN: 10 INJECTION, EMULSION INTRAVENOUS at 02:03

## 2022-03-04 RX ADMIN — MORPHINE SULFATE 4 MG: 4 INJECTION INTRAVENOUS at 08:03

## 2022-03-04 RX ADMIN — PROCHLORPERAZINE EDISYLATE 5 MG: 5 INJECTION INTRAMUSCULAR; INTRAVENOUS at 12:03

## 2022-03-04 RX ADMIN — ENOXAPARIN SODIUM 40 MG: 100 INJECTION SUBCUTANEOUS at 09:03

## 2022-03-04 RX ADMIN — OXYCODONE 5 MG: 5 TABLET ORAL at 09:03

## 2022-03-04 RX ADMIN — Medication 100 MG: at 02:03

## 2022-03-04 RX ADMIN — OXYCODONE 5 MG: 5 TABLET ORAL at 04:03

## 2022-03-04 RX ADMIN — FAMOTIDINE 20 MG: 10 INJECTION INTRAVENOUS at 08:03

## 2022-03-04 RX ADMIN — MIDAZOLAM HYDROCHLORIDE 2 MG: 1 INJECTION, SOLUTION INTRAMUSCULAR; INTRAVENOUS at 02:03

## 2022-03-04 NOTE — TREATMENT PLAN
AES Treatment Plan      EGD completed  - previously placed pancreatic duct stent removed  - no evidence of polyp seen in ampulla     - s/p EGD (see procedure note)  - repeat EGD in 1 year for surveillance  - remaining plan as per consult note      Thank you for involving us in the care of Heidi Zhang Caro. Please call with any additional questions, concerns or changes in the patient's clinical status.      Moe Jose MD  Gastroenterology Fellow, PGY-VI  [facility]

## 2022-03-04 NOTE — ED PROVIDER NOTES
Encounter Date: 3/4/2022       History     Chief Complaint   Patient presents with    Abdominal Pain     Upper abdominal pain x 2 days, constant and sharp in nature with waves of pain, -N/V. Pt states recent hospitalization for pancreatitis (stents in place) and IBS.      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 from 1/28/22 through 2/1/22 for acute pancreatitis. She was doing better after discharge, and then had an IBS flare which she was seen and treated by her GI specialist for on 2/16/22. She was treated with a trial of rifaximin. She states that her IBS symptoms improved, but now she is concerned that she has pancreatitis again because for the past week she has had waves of diffuse upper abdominal pain. For the past 2 days, her pain has become more intense and constant. She states that the pain is worse after she eats or drinks anything. She states that she is concerned about her stent. She also states that she read that abdominal pain is listed as a possible side effects of the new medication Rifaximin that she started taking. She states that she has not had anything to eat or drink today because she is worried that her pain will increase. She has been taking Toradol and Norco at home for pain sporadically without adequate relief. She denies any fever or chills. She has had mild intermittent nausea, but denies any vomiting. She states that her bowel movements have been normal and denies any diarrhea, constipation, or GI bleeding. She reports normal urination. She reports normal menses. She denies any additional symptoms or concerns. She states that her pain is 7/10 on the pain scale presently.         Review of patient's allergies indicates:  No Known Allergies  Past Medical History:   Diagnosis Date    Acute  pancreatitis     Ectopic pregnancy     Endometriosis     GERD (gastroesophageal reflux disease)     H/O chlamydia infection     Hiatal hernia 07/2021    IBS (irritable bowel syndrome)     Insulin resistance     Kidney stone     PCOS (polycystic ovarian syndrome)     PONV (postoperative nausea and vomiting)      Past Surgical History:   Procedure Laterality Date    COLONOSCOPY N/A 12/29/2021    Procedure: COLONOSCOPY Patient had a positive covid test on 12/6/22. She will bring positive results with her to the hospital.;  Surgeon: Davi Baldwin MD;  Location: [facility];  Service: Endoscopy;  Laterality: N/A;    CYSTOSCOPY W/ RETROGRADES Left 7/1/2021    Procedure: CYSTOSCOPY, WITH RETROGRADE PYELOGRAM;  Surgeon: Sam Griffiths MD;  [facility];  Service: Urology;  Laterality: Left;    DIAGNOSTIC LAPAROSCOPY      DILATION AND CURETTAGE OF UTERUS      ENDOSCOPIC ULTRASOUND OF UPPER GASTROINTESTINAL TRACT N/A 1/27/2022    Procedure: ULTRASOUND, UPPER GI TRACT, ENDOSCOPIC;  Surgeon: Jim Velazquez MD;  [facility]  Service: Endoscopy;  Laterality: N/A;  covid-1/24/22[facility]  instructions sent via portal-BB    ERCP N/A 1/27/2022    Procedure: ERCP (ENDOSCOPIC RETROGRADE CHOLANGIOPANCREATOGRAPHY);  Surgeon: Jim Velazquez MD;  [facility]  Service: Endoscopy;  Laterality: N/A;  covid-1/24/22[facility]  instructions sent via portal-BB    ESOPHAGOGASTRODUODENOSCOPY N/A 12/29/2021    Procedure: EGD (ESOPHAGOGASTRODUODENOSCOPY);  Surgeon: Davi Baldwin MD;  Location: [facility];  Service: Endoscopy;  Laterality: N/A;    EXTRACORPOREAL SHOCK WAVE LITHOTRIPSY Left 7/1/2021    Procedure: LITHOTRIPSY, ESWL Attempted;  Surgeon: Sam Griffiths MD;  [facility];  Service: Urology;  Laterality: Left;    INTERNAL URETHROTOMY N/A 7/1/2021    Procedure: URETHROTOMY, INTERNAL;  Surgeon: Sam Griffiths MD;  [facility];  Service: Urology;   Laterality: N/A;    LASER LITHOTRIPSY Left 7/1/2021    Procedure: LITHOTRIPSY, USING LASER;  Surgeon: Sam Griffiths MD;  Location: [facility] OR;  Service: Urology;  Laterality: Left;    polyp removed      stent to Pancreas      TONSILLECTOMY      URETEROSCOPY Left 7/1/2021    Procedure: URETEROSCOPY;  Surgeon: Sam Griffiths MD;  Location: [facility] OR;  Service: Urology;  Laterality: Left;    WISDOM TOOTH EXTRACTION       Family History   Problem Relation Age of Onset    Diabetes Father     Hypertension Mother     Diabetes Paternal Uncle     Heart disease Maternal Grandfather     Cancer Paternal Grandmother         leukemia    Diabetes Paternal Grandfather     Breast cancer Paternal Aunt     Colon cancer Neg Hx     Ovarian cancer Neg Hx     Glaucoma Neg Hx     Amblyopia Neg Hx     Blindness Neg Hx     Macular degeneration Neg Hx     Retinal detachment Neg Hx     Strabismus Neg Hx      Social History     Tobacco Use    Smoking status: Never Smoker    Smokeless tobacco: Never Used   Substance Use Topics    Alcohol use: Yes     Alcohol/week: 0.0 standard drinks     Comment: socially    Drug use: No     Review of Systems   Constitutional: Negative for activity change, chills, diaphoresis, fatigue and fever.   HENT: Negative for congestion, rhinorrhea and sore throat.    Eyes: Negative for visual disturbance.   Respiratory: Negative for cough and shortness of breath.    Cardiovascular: Negative for chest pain and palpitations.   Gastrointestinal: Positive for abdominal pain and nausea. Negative for abdominal distention, blood in stool, constipation, diarrhea and vomiting.   Genitourinary: Negative for decreased urine volume, difficulty urinating, dysuria, flank pain, menstrual problem, pelvic pain, vaginal bleeding and vaginal discharge.   Musculoskeletal: Negative for back pain and gait problem.   Skin: Negative for color change.   Allergic/Immunologic: Negative for immunocompromised state.    Neurological: Negative for dizziness, syncope, light-headedness and headaches.   Psychiatric/Behavioral: Negative for confusion.       Physical Exam     Initial Vitals [03/04/22 0655]   BP Pulse Resp Temp SpO2   (!) 139/99 92 18 98.8 °F (37.1 °C) 99 %      MAP       --         Physical Exam    Nursing note and vitals reviewed.  Constitutional: She appears well-developed and well-nourished. She is not diaphoretic.   She is alert and ambulatory. She appears uncomfortable.    HENT:   Head: Normocephalic.   Moist oral mucosa.    Eyes: Conjunctivae are normal. No scleral icterus.   Neck: Neck supple.   Cardiovascular: Normal rate.   Pulmonary/Chest: No respiratory distress.   Abdominal: Abdomen is soft. She exhibits no distension.   Diffuse upper abdominal tenderness - mild to moderate - negative Rodriguez's sign - negative McBurney's point There is no rebound and no guarding.   Musculoskeletal:         General: Normal range of motion.      Cervical back: Neck supple.     Neurological: She is alert and oriented to person, place, and time. She has normal strength.   Skin: Skin is warm and dry.   No jaundice    Psychiatric: She has a normal mood and affect. Her behavior is normal.         ED Course   Procedures  Labs Reviewed   URINALYSIS, REFLEX TO URINE CULTURE - Abnormal; Notable for the following components:       Result Value    Ketones, UA Trace (*)     Occult Blood UA 1+ (*)     All other components within normal limits    Narrative:     Specimen Source->Urine   CBC W/ AUTO DIFFERENTIAL - Abnormal; Notable for the following components:    WBC 13.50 (*)     MCH 26.3 (*)     MCHC 31.7 (*)     Gran # (ANC) 9.9 (*)     Gran % 73.1 (*)     All other components within normal limits   COMPREHENSIVE METABOLIC PANEL - Abnormal; Notable for the following components:    CO2 22 (*)     Alkaline Phosphatase 136 (*)     All other components within normal limits   LIPASE - Abnormal; Notable for the following components:    Lipase  987 (*)     All other components within normal limits   URINALYSIS MICROSCOPIC - Abnormal; Notable for the following components:    RBC, UA 7 (*)     All other components within normal limits    Narrative:     Specimen Source->Urine   POCT URINE PREGNANCY   SARS-COV-2 RDRP GENE     Results for orders placed or performed during the hospital encounter of 03/04/22   Urinalysis, Reflex to Urine Culture Urine, Clean Catch    Specimen: Urine   Result Value Ref Range    Specimen UA Urine, Clean Catch     Color, UA Yellow Yellow, Straw, Laura    Appearance, UA Clear Clear    pH, UA 6.0 5.0 - 8.0    Specific Gravity, UA 1.020 1.005 - 1.030    Protein, UA Negative Negative    Glucose, UA Negative Negative    Ketones, UA Trace (A) Negative    Bilirubin (UA) Negative Negative    Occult Blood UA 1+ (A) Negative    Nitrite, UA Negative Negative    Leukocytes, UA Negative Negative   CBC auto differential   Result Value Ref Range    WBC 13.50 (H) 3.90 - 12.70 K/uL    RBC 4.68 4.00 - 5.40 M/uL    Hemoglobin 12.3 12.0 - 16.0 g/dL    Hematocrit 38.8 37.0 - 48.5 %    MCV 83 82 - 98 fL    MCH 26.3 (L) 27.0 - 31.0 pg    MCHC 31.7 (L) 32.0 - 36.0 g/dL    RDW 14.2 11.5 - 14.5 %    Platelets 311 150 - 450 K/uL    MPV 9.7 9.2 - 12.9 fL    Immature Granulocytes 0.2 0.0 - 0.5 %    Gran # (ANC) 9.9 (H) 1.8 - 7.7 K/uL    Immature Grans (Abs) 0.03 0.00 - 0.04 K/uL    Lymph # 2.5 1.0 - 4.8 K/uL    Mono # 0.9 0.3 - 1.0 K/uL    Eos # 0.2 0.0 - 0.5 K/uL    Baso # 0.04 0.00 - 0.20 K/uL    nRBC 0 0 /100 WBC    Gran % 73.1 (H) 38.0 - 73.0 %    Lymph % 18.7 18.0 - 48.0 %    Mono % 6.4 4.0 - 15.0 %    Eosinophil % 1.3 0.0 - 8.0 %    Basophil % 0.3 0.0 - 1.9 %    Differential Method Automated    Comprehensive metabolic panel   Result Value Ref Range    Sodium 140 136 - 145 mmol/L    Potassium 4.3 3.5 - 5.1 mmol/L    Chloride 105 95 - 110 mmol/L    CO2 22 (L) 23 - 29 mmol/L    Glucose 105 70 - 110 mg/dL    BUN 11 6 - 20 mg/dL    Creatinine 0.6 0.5 - 1.4  mg/dL    Calcium 9.7 8.7 - 10.5 mg/dL    Total Protein 7.6 6.0 - 8.4 g/dL    Albumin 3.7 3.5 - 5.2 g/dL    Total Bilirubin 0.7 0.1 - 1.0 mg/dL    Alkaline Phosphatase 136 (H) 55 - 135 U/L    AST 17 10 - 40 U/L    ALT 23 10 - 44 U/L    Anion Gap 13 8 - 16 mmol/L    eGFR if African American >60.0 >60 mL/min/1.73 m^2    eGFR if non African American >60.0 >60 mL/min/1.73 m^2   Lipase   Result Value Ref Range    Lipase 987 (H) 4 - 60 U/L   Urinalysis Microscopic   Result Value Ref Range    RBC, UA 7 (H) 0 - 4 /hpf    WBC, UA 0 0 - 5 /hpf    Bacteria Rare None-Occ /hpf    Squam Epithel, UA 1 /hpf    Microscopic Comment SEE COMMENT    POCT urine pregnancy   Result Value Ref Range    POC Preg Test, Ur Negative Negative     Acceptable Yes             Imaging Results           CT Abdomen Pelvis With Contrast (Final result)  Result time 03/04/22 09:03:07    Final result by Favio Persaud MD (03/04/22 09:03:07)                 Impression:      1. Pancreatic stent in place with mild peripancreatic fat stranding similar to CT 01/28/2022 and suggestive of ongoing acute pancreatitis.  2. Bilateral nonobstructing nephrolithiasis.  3. Additional findings as above.  This report was flagged in Epic as abnormal.    Electronically signed by resident: Andrew Walker  Date:    03/04/2022  Time:    08:21    Electronically signed by: Favio Pesraud MD  Date:    03/04/2022  Time:    09:03             Narrative:    EXAMINATION:  CT ABDOMEN PELVIS WITH CONTRAST    CLINICAL HISTORY:  Epigastric pain;Hx of pancreatitis and biliary stent;    TECHNIQUE:  Axial images of the abdomen and pelvis were acquired  after the use of 100 cc [id number] IV contrast.  Coronal and sagittal reconstructions were also obtained    COMPARISON:  CT abdomen pelvis 01/28/2022    FINDINGS:  Heart: Normal in size. No pericardial effusion. No significant calcific coronary atherosclerosis.    Lungs: Lungs symmetrically expanded without consolidation.  Small pleural  based opacities in the basilar left lower lobe (axial series 2, images 18 and 27) in an area of previously seen subsegmental atelectasis, likely representing residual subsegmental atelectasis.  No pleural fluid or pneumothorax.    Liver: Normal in size and contour.  No focal hepatic lesion.  Portal veins are patent.    Gallbladder: No calcified gallstones.    Bile Ducts: No evidence of dilated ducts.    Pancreas: Pancreatic stent in place.  There is mild peripancreatic fat stranding similar to CT 01/28/2022.  No peripancreatic fluid collections.  No pancreatic mass.    Spleen: Punctate splenic calcification, otherwise unremarkable.    Stomach and duodenum: Unremarkable.    Adrenals: Unremarkable.    Kidneys/ Ureters: Normal in size and location. Normal enhancement. Multiple small bilateral renal stones with the largest measuring 4 mm in the right kidney.  No hydronephrosis or ureteral dilatation.    Bladder: Not well distended.  No asymmetric wall thickening.    Reproductive organs: 2.7 cm right ovarian cyst, a normal physiologic finding in this 31-year-old female.  Uterus unremarkable.    Bowel/Mesentery: Small bowel is normal in caliber with no evidence of obstruction. No evidence of inflammation or wall thickening.  Appendix not definitively identified.  No inflammatory change in the expected location of the appendix.  Colon demonstrates no focal wall thickening.    Peritoneum: No intraperitoneal free air or fluid    Lymph nodes: Multiple prominent nonenlarged mesenteric and retroperitoneal lymph nodes, similar to prior.    Vasculature: No aneurysm. No significant calcific atherosclerosis.    Abdominal wall:  Unremarkable.    Bones: No acute fracture. No suspicious osseous lesions.                                 Medications   sodium chloride 0.9% flush 10 mL (has no administration in time range)   morphine injection 4 mg (has no administration in time range)   ondansetron injection 4 mg (has no administration in  time range)   sodium chloride 0.9% bolus 1,000 mL (0 mLs Intravenous Stopped 3/4/22 0912)   famotidine (PF) injection 20 mg (20 mg Intravenous Given 3/4/22 0843)   metoclopramide HCl injection 5 mg (5 mg Intravenous Given 3/4/22 0806)   iohexoL (OMNIPAQUE 350) injection 100 mL (100 mLs Intravenous Given 3/4/22 0758)   morphine injection 4 mg (4 mg Intravenous Given 3/4/22 0839)     Medical Decision Making:   Initial Assessment:   30 yo female, hx of IBS, biliary stent, pancreatitis, presents c/o acute worsening diffuse upper abdominal pain x 1 week, worse x 2 days, worse after PO intake   Differential Diagnosis:   Pancreatitis, Biliary colic, cholecystitis, hepatitis, gastritis, IBS, intestinal spasm, medication side effect, hiatal hernia, GERD, dyspepsia, choledocholithiasis, etc    Clinical Tests:   Lab Tests: Ordered and Reviewed  Radiological Study: Ordered and Reviewed  ED Management:  Vital signs reviewed   Records reviewed   Lipase at 978  Elevated WBC count   CT shows inflammation and pancreatitis   Pt requiring multiple rounds pain meds    specifies inpatient criteria is met   Hospital medicine requests admission under Dr Muhammad                       Clinical Impression:   Final diagnoses:  [K85.90] Acute pancreatitis, unspecified complication status, unspecified pancreatitis type (Primary)  [Z98.890] History of biliary duct stent placement          ED Disposition Condition    Admit               Ned Dos Santos PA-C  03/04/22 [id number]

## 2022-03-04 NOTE — H&P
[facility] - Emergency Dept  Hospital Medicine  History & Physical    Patient Name: Heidi Zhang Caro  MRN: [id number]  Patient Class: IP- Inpatient  Admission Date: 3/4/2022  Attending Physician: Stacey Muhammad MD   Primary Care Provider: Jad Walker MD         Patient information was obtained from patient, past medical records and ER records.     Subjective:     Principal Problem:Acute pancreatitis    Chief Complaint:   Chief Complaint   Patient presents with    Abdominal Pain     Upper abdominal pain x 2 days, constant and sharp in nature with waves of pain, -N/V. Pt states recent hospitalization for pancreatitis (stents in place) and IBS.         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 She was recently admitted at [facility] 1/28-2/1 for post-ERCP pancreatitis and was feeling better after discharge. She reports that she began having pain across her upper abdomen two days ago. The pain is constant with waves of more severe pain. She reports that the pain worsens with eating and drinking. She has tried toradol without relief. She reports nausea/vomiting that began while in the ED. She denies fevers/chills. She reports recent alcohol use of 7 beers on [place]ardi [place] but states that she otherwise drinks socially.    In the ED, T98.8, /99, HR 92, SpO2 99%. Labs notable for WBC 13.5, lipase 987. CT abdomen/pelvis showed pancreatic stent in place with mild peripancreatic fat stranding similar to CT 01/28/2022 and suggestive of ongoing acute pancreatitis. Admitted to hospital medicine for further management.       Past Medical History:   Diagnosis Date    Acute pancreatitis     Ectopic pregnancy     Endometriosis     GERD (gastroesophageal reflux disease)     H/O chlamydia infection     Hiatal hernia 07/2021    IBS (irritable bowel syndrome)      Insulin resistance     Kidney stone     PCOS (polycystic ovarian syndrome)     PONV (postoperative nausea and vomiting)        Past Surgical History:   Procedure Laterality Date    COLONOSCOPY N/A 12/29/2021    Procedure: COLONOSCOPY Patient had a positive covid test on 12/6/22. She will bring positive results with her to the hospital.;  Surgeon: Davi Baldwin MD;  Location: [facility];  Service: Endoscopy;  Laterality: N/A;    CYSTOSCOPY W/ RETROGRADES Left 7/1/2021    Procedure: CYSTOSCOPY, WITH RETROGRADE PYELOGRAM;  Surgeon: Sam Griffiths MD;  Location: [facility];  Service: Urology;  Laterality: Left;    DIAGNOSTIC LAPAROSCOPY      DILATION AND CURETTAGE OF UTERUS      ENDOSCOPIC ULTRASOUND OF UPPER GASTROINTESTINAL TRACT N/A 1/27/2022    Procedure: ULTRASOUND, UPPER GI TRACT, ENDOSCOPIC;  Surgeon: Jim Velazquez MD;  Location: [facility] ([address] [place] [address]);  Service: Endoscopy;  Laterality: N/A;  covid-1/24/22-[facility]  instructions sent via portal-BB    ERCP N/A 1/27/2022    Procedure: ERCP (ENDOSCOPIC RETROGRADE CHOLANGIOPANCREATOGRAPHY);  Surgeon: Jim Velazquez MD;  Location: [facility] ([address] [place] [address]);  Service: Endoscopy;  Laterality: N/A;  covid-1/24/22-[facility]  instructions sent via portal-BB    ESOPHAGOGASTRODUODENOSCOPY N/A 12/29/2021    Procedure: EGD (ESOPHAGOGASTRODUODENOSCOPY);  Surgeon: Davi Baldwin MD;  Location: [facility];  Service: Endoscopy;  Laterality: N/A;    EXTRACORPOREAL SHOCK WAVE LITHOTRIPSY Left 7/1/2021    Procedure: LITHOTRIPSY, ESWL Attempted;  Surgeon: Sam Griffiths MD;  Location: [facility];  Service: Urology;  Laterality: Left;    INTERNAL URETHROTOMY N/A 7/1/2021    Procedure: URETHROTOMY, INTERNAL;  Surgeon: Sam Griffiths MD;  Location: [facility];  Service: Urology;  Laterality: N/A;    LASER LITHOTRIPSY Left 7/1/2021    Procedure: LITHOTRIPSY, USING LASER;  Surgeon: Sam Griffiths MD;  Location: [facility];  Service: Urology;  Laterality: Left;    polyp  removed      stent to Pancreas      TONSILLECTOMY      URETEROSCOPY Left 7/1/2021    Procedure: URETEROSCOPY;  Surgeon: Sam Griffiths MD;  Location: [facility] OR;  Service: Urology;  Laterality: Left;    WISDOM TOOTH EXTRACTION         Review of patient's allergies indicates:  No Known Allergies    Current Facility-Administered Medications on File Prior to Encounter   Medication    acetaminophen tablet 650 mg    albuterol inhaler 2 puff    diphenhydrAMINE injection 25 mg    EPINEPHrine (EPIPEN) 0.3 mg/0.3 mL pen injection 0.3 mg    methylPREDNISolone sodium succinate injection 40 mg    ondansetron disintegrating tablet 4 mg    sodium chloride 0.9% 500 mL flush bag     Current Outpatient Medications on File Prior to Encounter   Medication Sig    ketorolac (TORADOL) 10 mg tablet Take 10 mg by mouth every 6 (six) hours.    NP THYROID 30 mg Tab Take 30 mg by mouth once daily.    omeprazole (PRILOSEC) 40 MG capsule Take 40 mg by mouth as needed.    prenatal vit calc,iron,folic (PRENATAL VITAMIN ORAL) Take by mouth.    cholecalciferol, vitamin D3, (VITAMIN D3) 50 mcg (2,000 unit) Cap Take 3 capsules by mouth once daily.     dicyclomine (BENTYL) 20 mg tablet Take 1 tablet (20 mg total) by mouth 2 (two) times daily.    ferrous sulfate 325 (65 FE) MG EC tablet Take 325 mg by mouth once daily.    fluticasone (FLONASE) 50 mcg/actuation nasal spray 1 spray (50 mcg total) by Each Nare route once daily.    HYDROcodone-acetaminophen (NORCO)  mg per tablet Take 1 tablet by mouth.    lactobacillus comb no.10 (PROBIOTIC) 20 billion cell Cap Take by mouth.    MAGNESIUM CITRATE ORAL Take 250 mg by mouth once daily.    magnesium oxide 400 mg magnesium Cap Take by mouth once.    omega-3 fatty acids/fish oil (FISH OIL-OMEGA-3 FATTY ACIDS) 300-1,000 mg capsule Take by mouth once daily.    progesterone (PROMETRIUM) 200 MG capsule Take 300 mg by mouth.    rifAXIMin (XIFAXAN) 550 mg Tab Take 1 tablet (550 mg  total) by mouth 3 (three) times daily.    TURMERIC ORAL Take by mouth.    UNABLE TO FIND 1 tablet once daily. Coenzyme B6, P-5-P    UNABLE TO FIND 2,000 mg once daily. eric-inositol    UNABLE TO FIND HCG injections     Family History       Problem Relation (Age of Onset)    Breast cancer Paternal Aunt    Cancer Paternal Grandmother    Diabetes Father, Paternal Uncle, Paternal Grandfather    Heart disease Maternal Grandfather    Hypertension Mother          Tobacco Use    Smoking status: Never Smoker    Smokeless tobacco: Never Used   Substance and Sexual Activity    Alcohol use: Yes     Alcohol/week: 0.0 standard drinks     Comment: socially    Drug use: No    Sexual activity: Yes     Partners: Male     Birth control/protection: None     Review of Systems   Constitutional:  Positive for appetite change. Negative for chills and fever.   HENT:  Negative for sore throat and trouble swallowing.    Eyes:  Negative for visual disturbance.   Respiratory:  Negative for cough and shortness of breath.    Cardiovascular:  Negative for chest pain and leg swelling.   Gastrointestinal:  Positive for abdominal pain, nausea and vomiting. Negative for constipation and diarrhea.   Genitourinary:  Negative for difficulty urinating.   Musculoskeletal:  Negative for gait problem.   Neurological:  Negative for dizziness and weakness.   Psychiatric/Behavioral:  Negative for agitation and confusion.    Objective:     Vital Signs (Most Recent):  Temp: 98.3 °F (36.8 °C) (03/04/22 1100)  Pulse: 86 (03/04/22 1100)  Resp: 18 (03/04/22 1100)  BP: 127/72 (03/04/22 1100)  SpO2: 99 % (03/04/22 1100) Vital Signs (24h Range):  Temp:  [98.3 °F (36.8 °C)-98.8 °F (37.1 °C)] 98.3 °F (36.8 °C)  Pulse:  [79-92] 86  Resp:  [16-18] 18  SpO2:  [97 %-99 %] 99 %  BP: (127-153)/(72-99) 127/72     Weight: 122.5 kg (270 lb)  Body mass index is 49.38 kg/m².    Physical Exam  Constitutional:       Appearance: She is obese. She is not toxic-appearing.   HENT:       Head: Normocephalic and atraumatic.      Nose: Nose normal.   Eyes:      Extraocular Movements: Extraocular movements intact.      Conjunctiva/sclera: Conjunctivae normal.   Cardiovascular:      Rate and Rhythm: Normal rate and regular rhythm.      Heart sounds: Normal heart sounds. No murmur heard.  Pulmonary:      Effort: Pulmonary effort is normal. No respiratory distress.      Breath sounds: Normal breath sounds. No rhonchi.   Abdominal:      General: Bowel sounds are normal. There is no distension.      Palpations: Abdomen is soft.      Tenderness: There is abdominal tenderness (primarily upper abdomen). There is no guarding.   Musculoskeletal:      Cervical back: Normal range of motion.      Right lower leg: No edema.      Left lower leg: No edema.   Skin:     General: Skin is warm and dry.   Neurological:      General: No focal deficit present.      Mental Status: She is alert and oriented to person, place, and time.   Psychiatric:         Mood and Affect: Mood normal.         Behavior: Behavior normal.           Significant Labs: All pertinent labs within the past 24 hours have been reviewed.  CBC:   Recent Labs   Lab 03/04/22  0740   WBC 13.50*   HGB 12.3   HCT 38.8        CMP:   Recent Labs   Lab 03/04/22  0740      K 4.3      CO2 22*      BUN 11   CREATININE 0.6   CALCIUM 9.7   PROT 7.6   ALBUMIN 3.7   BILITOT 0.7   ALKPHOS 136*   AST 17   ALT 23   ANIONGAP 13   EGFRNONAA >60.0     Lipase:   Recent Labs   Lab 03/04/22  0740   LIPASE 987*       Significant Imaging: I have reviewed all pertinent imaging results/findings within the past 24 hours.    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 Stent removal had been planned for 3/18. Afebrile and hemodynamically stable.  Lipase 987. CT abdomen pelvis shows acute pancreatitis.   -  mL/hr  - oxycodone 5 mg q4h for moderate pain  - dilaudid 1 mg IV q6h for breakthrough pain  - prn zofran for nausea  - check triglycerides; wnl   - AES consult, appreciate recs    - plan for EGD today for PD stent removal  - NPO     Irritable bowel syndrome with diarrhea  Follows with Dr. Avilez. Recently started on trial of rifaximin.    GERD (gastroesophageal reflux disease)  Takes omeprazole as needed at home.     Hypothyroidism  - continue np thyroid 30 mg     VTE Risk Mitigation (From admission, onward)         Ordered     enoxaparin injection 40 mg  Every 12 hours         03/04/22 1139     IP VTE HIGH RISK PATIENT  Once         03/04/22 1139     Place sequential compression device  Until discontinued         03/04/22 1139                   Aliyah Tavarez MD  Department of Hospital Medicine   [facility] - Emergency Dept

## 2022-03-04 NOTE — ED NOTES
Patient identifiers verified and correct for Ms Haynes  C/C: ABd pain SEE NN  APPEARANCE: awake and alert in NAD.  SKIN: warm, dry and intact. No breakdown or bruising.  MUSCULOSKELETAL: Patient moving all extremities spontaneously, no obvious swelling or deformities noted. Ambulates independently.  RESPIRATORY: Denies shortness of breath.Respirations unlabored.   CARDIAC: Denies CP, 2+ distal pulses; no peripheral edema  ABDOMEN: ABdomen round, pain to  Upper abdomen   : voids spontaneously, denies difficulty  Neurologic: AAO x 4; follows commands equal strength in all extremities; denies numbness/tingling. Denies dizziness Deneis weakness

## 2022-03-04 NOTE — TRANSFER OF CARE
"Anesthesia Transfer of Care Note    Patient: Heidi Zhang Caro    Procedure(s) Performed: Procedure(s) (LRB):  EGD (ESOPHAGOGASTRODUODENOSCOPY) (N/A)    Patient location: PACU    Anesthesia Type: general    Transport from OR: Transported from OR on room air with adequate spontaneous ventilation    Post pain: adequate analgesia    Post assessment: no apparent anesthetic complications    Post vital signs: stable    Level of consciousness: awake    Nausea/Vomiting: no nausea/vomiting    Complications: none    Transfer of care protocol was followed      Last vitals:   Visit Vitals  BP (!) 140/67   Pulse 92   Temp 36.9 °C (98.4 °F)   Resp 16   Ht 5' 2" (1.575 m)   Wt 122.5 kg (270 lb)   LMP 02/10/2022   SpO2 100%   Breastfeeding No   BMI 49.38 kg/m²     "

## 2022-03-04 NOTE — PHARMACY MED REC
"Admission Medication History     The home medication history was taken by Linda Fisher.    You may go to "Admission" then "Reconcile Home Medications" tabs to review and/or act upon these items.      The home medication list has been updated by the Pharmacy department.    Please read ALL comments highlighted in yellow.    Please address this information as you see fit.     Feel free to contact us if you have any questions or require assistance.      The medications listed below were removed from the home medication list. Please reorder if appropriate:  Patient reports no longer taking the following medication(s):   FLUTICASONE 50 mcg NS    Medications listed below were obtained from: Patient    PTA Medications   Medication Sig    cholecalciferol, vitamin D3, (VITAMIN D3) 50 mcg (2,000 unit) Cap   Take 3 capsules by mouth once daily.     dicyclomine (BENTYL) 20 mg tablet   Take 1 tablet (20 mg total) by mouth 2 (two) times daily.    ferrous sulfate 325 (65 FE) MG EC tablet   Take 325 mg by mouth once daily.    HYDROcodone-acetaminophen (NORCO)  mg per tablet   Take 1 tablet by mouth as needed for Pain.    ketorolac (TORADOL) 10 mg tablet   Take 10 mg by mouth every 6 (six) hours.    lactobacillus comb no.10 (PROBIOTIC) 20 billion cell Cap   Take 1 capsule by mouth once daily.    magnesium oxide 400 mg magnesium Cap   Take 1 capsule by mouth once daily.    NP THYROID 30 mg Tab   Take 30 mg by mouth once daily.    omega-3 fatty acids/fish oil (FISH OIL-OMEGA-3 FATTY ACIDS) 300-1,000 mg capsule   Take by mouth once daily.    omeprazole (PRILOSEC) 40 MG capsule   Take 40 mg by mouth as needed.    prenatal vit calc,iron,folic (PRENATAL VITAMIN ORAL)   Take by mouth daily.    progesterone (PROMETRIUM) 200 MG capsule   Take 300 mg by mouth as needed. Based on cycle.    rifAXIMin (XIFAXAN) 550 mg Tab   Take 1 tablet (550 mg total) by mouth 3 (three) times daily.    TURMERIC ORAL   Take by mouth daily. "    ubidecarenone (COENZYME Q10 ORAL)   Take 1 tablet by mouth once daily.    UNABLE TO FIND   1 tablet once daily. Coenzyme B6, P-5-P    UNABLE TO FIND   2,000 mg once daily. eric-inositol    UNABLE TO FIND HCG injections - Based on cycle.       Linda Fisher, [facility]  EXT [id number]                  .

## 2022-03-04 NOTE — PROVATION PATIENT INSTRUCTIONS
Discharge Summary/Instructions after an Endoscopic Procedure  Patient Name: Heidi Haynes  Patient MRN: [id number]  Patient YOB: 1990     Friday, March 4, 2022  Jim Velazquez MD  Dear patient,  As a result of recent federal legislation (The Federal Cures Act), you may   receive lab or pathology results from your procedure in your [facility]   account before your physician is able to contact you. Your physician or   their representative will relay the results to you with their   recommendations at their soonest availability.  Thank you,  RESTRICTIONS:  During your procedure today, you received medications for sedation.  These   medications may affect your judgment, balance and coordination.  Therefore,   for 24 hours, you have the following restrictions:   - DO NOT drive a car, operate machinery, make legal/financial decisions,   sign important papers or drink alcohol.    ACTIVITY:  Today: no heavy lifting, straining or running due to procedural   sedation/anesthesia.  The following day: return to full activity including work.  DIET:  Eat and drink normally unless instructed otherwise.     TREATMENT FOR COMMON SIDE EFFECTS:  - Mild abdominal pain, nausea, belching, bloating or excessive gas:  rest,   eat lightly and use a heating pad.  - Sore Throat: treat with throat lozenges and/or gargle with warm salt   water.  - Because air was used during the procedure, expelling large amounts of air   from your rectum or belching is normal.  - If a bowel prep was taken, you may not have a bowel movement for 1-3 days.    This is normal.  SYMPTOMS TO WATCH FOR AND REPORT TO YOUR PHYSICIAN:  1. Abdominal pain or bloating, other than gas cramps.  2. Chest pain.  3. Back pain.  4. Signs of infection such as: chills or fever occurring within 24 hours   after the procedure.  5. Rectal bleeding, which would show as bright red, maroon, or black stools.   (A tablespoon of blood from the rectum is not serious, especially if    hemorrhoids are present.)  6. Vomiting.  7. Weakness or dizziness.  GO DIRECTLY TO THE NEAREST EMERGENCY ROOM IF YOU HAVE ANY OF THE FOLLOWING:      Difficulty breathing              Chills and/or fever over 101 F   Persistent vomiting and/or vomiting blood   Severe abdominal pain   Severe chest pain   Black, tarry stools   Bleeding- more than one tablespoon   Any other symptom or condition that you feel may need urgent attention  Your doctor recommends these additional instructions:  If any biopsies were taken, your doctors clinic will contact you in 1 to 2   weeks with any results.  - Return patient to [facility] for ongoing care.   - Continue present medications.   - Resume previous diet.   - Repeat upper endoscopy in 1 year for surveillance.  For questions, problems or results please call your physician - Jim Velazquez MD at Work:  [phone].  [facility], EMERGENCY ROOM PHONE NUMBER: [phone]  IF A COMPLICATION OR EMERGENCY SITUATION ARISES AND YOU ARE UNABLE TO REACH   YOUR PHYSICIAN - GO DIRECTLY TO THE EMERGENCY ROOM.  Jim Velazquez MD  3/4/2022 2:43:25 PM  This report has been verified and signed electronically.  Dear patient,  As a result of recent federal legislation (The Federal Cures Act), you may   receive lab or pathology results from your procedure in your [facility]   account before your physician is able to contact you. Your physician or   their representative will relay the results to you with their   recommendations at their soonest availability.  Thank you,  PROVATION

## 2022-03-04 NOTE — CONSULTS
[facility]  Advanced Endoscopy Service  Consult Note    Patient Name: Heidi Zhang Caro  MRN: [id number]  Admission Date: 3/4/2022  Hospital Length of Stay: 0 days  Code Status: Full Code   Attending Provider: Stacey Muhammad MD   Consulting Provider: Moe Jose MD  Primary Care Physician: Jad Walker MD  Principal Problem:<principal problem not specified>    Inpatient consult to Advanced Endoscopy Service (AES)  Consult performed by: Moe Jose MD  Consult ordered by: Aliyah Ashley MD        Subjective:     HPI: Heidi Zhang Caro is a 31 y.o. 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    Patient was recently found to have a duodenal mass on EGD 12/29/21 for epigastric pain (path revealed tubular adenoma). EUS and ERCP 1/27/22 with 21mm major papilla mass, pancreatic sphincterotomy performed, pancreatic stent placed, snare papillectomy of major papilla mass performed with two clips placed (pathrevealed adenoma with low grade dysplasia). She was admitted afterward to [facility] with post-ERCP pancreatitis (lipase >5000) and discharged on 2/1. She did well thereafter and had follow up with Dr. Avilez on 2/16. About a week ago, she started taking Xifaxan for worsening symptoms similar to her IBS-D which initially improved. However, two days ago had severe epigastric pain with associated nausea and vomiting. No fevers or chills.    VSN. WBC 13K. LFTs normal. Lipase 987.  CT with pancreatic stent in place, mild acute pancreatitis.    Past Medical History:   Diagnosis Date    Acute pancreatitis     Ectopic pregnancy     Endometriosis     GERD (gastroesophageal reflux disease)     H/O chlamydia infection     Hiatal hernia 07/2021    IBS (irritable bowel syndrome)     Insulin resistance     Kidney stone     PCOS (polycystic ovarian syndrome)     PONV (postoperative nausea and  vomiting)        Past Surgical History:   Procedure Laterality Date    COLONOSCOPY N/A 12/29/2021    Procedure: COLONOSCOPY Patient had a positive covid test on 12/6/22. She will bring positive results with her to the hospital.;  Surgeon: Davi Baldwin MD;  Location: [facility];  Service: Endoscopy;  Laterality: N/A;    CYSTOSCOPY W/ RETROGRADES Left 7/1/2021    Procedure: CYSTOSCOPY, WITH RETROGRADE PYELOGRAM;  Surgeon: Sam Griffiths MD;  Location: [facility] OR;  Service: Urology;  Laterality: Left;    DIAGNOSTIC LAPAROSCOPY      DILATION AND CURETTAGE OF UTERUS      ENDOSCOPIC ULTRASOUND OF UPPER GASTROINTESTINAL TRACT N/A 1/27/2022    Procedure: ULTRASOUND, UPPER GI TRACT, ENDOSCOPIC;  Surgeon: Jim Velazquez MD;  [facility]  Service: Endoscopy;  Laterality: N/A;  covid-1/24/22-[facility]  instructions sent via portal-BB    ERCP N/A 1/27/2022    Procedure: ERCP (ENDOSCOPIC RETROGRADE CHOLANGIOPANCREATOGRAPHY);  Surgeon: Jim Velazquez MD;  Location: [facility] ([address] [place] [address]);  Service: Endoscopy;  Laterality: N/A;  covid-1/24/22-[facility]  instructions sent via portal-BB    ESOPHAGOGASTRODUODENOSCOPY N/A 12/29/2021    Procedure: EGD (ESOPHAGOGASTRODUODENOSCOPY);  Surgeon: Davi Baldwin MD;  Location: [facility];  Service: Endoscopy;  Laterality: N/A;    EXTRACORPOREAL SHOCK WAVE LITHOTRIPSY Left 7/1/2021    Procedure: LITHOTRIPSY, ESWL Attempted;  Surgeon: Sam Griffiths MD;  Location: [facility] OR;  Service: Urology;  Laterality: Left;    INTERNAL URETHROTOMY N/A 7/1/2021    Procedure: URETHROTOMY, INTERNAL;  Surgeon: Sam Griffiths MD;  Location: [facility] OR;  Service: Urology;  Laterality: N/A;    LASER LITHOTRIPSY Left 7/1/2021    Procedure: LITHOTRIPSY, USING LASER;  Surgeon: Sam Griffiths MD;  Location: [facility] OR;  Service: Urology;  Laterality: Left;    polyp removed      stent to Pancreas      TONSILLECTOMY      URETEROSCOPY Left 7/1/2021    Procedure: URETEROSCOPY;   Surgeon: Sam Griffiths MD;  Location: [facility];  Service: Urology;  Laterality: Left;    WISDOM TOOTH EXTRACTION         Family History   Problem Relation Age of Onset    Diabetes Father     Hypertension Mother     Diabetes Paternal Uncle     Heart disease Maternal Grandfather     Cancer Paternal Grandmother         leukemia    Diabetes Paternal Grandfather     Breast cancer Paternal Aunt     Colon cancer Neg Hx     Ovarian cancer Neg Hx     Glaucoma Neg Hx     Amblyopia Neg Hx     Blindness Neg Hx     Macular degeneration Neg Hx     Retinal detachment Neg Hx     Strabismus Neg Hx        Social History     Socioeconomic History    Marital status:    Occupational History     Employer: [employer] [place]   Tobacco Use    Smoking status: Never Smoker    Smokeless tobacco: Never Used   Substance and Sexual Activity    Alcohol use: Yes     Alcohol/week: 0.0 standard drinks     Comment: socially    Drug use: No    Sexual activity: Yes     Partners: Male     Birth control/protection: None       Current Facility-Administered Medications on File Prior to Encounter   Medication Dose Route Frequency Provider Last Rate Last Admin    acetaminophen tablet 650 mg  650 mg Oral Once PRN Jad Walker MD        albuterol inhaler 2 puff  2 puff Inhalation Q20 Min PRN Jad Walker MD        diphenhydrAMINE injection 25 mg  25 mg Intravenous Once PRN Jad Walker MD        EPINEPHrine (EPIPEN) 0.3 mg/0.3 mL pen injection 0.3 mg  0.3 mg Intramuscular PRN Jad Walker MD        methylPREDNISolone sodium succinate injection 40 mg  40 mg Intravenous Once PRN Jad Walker MD        ondansetron disintegrating tablet 4 mg  4 mg Oral Once PRN Jad Walker MD        sodium chloride 0.9% 500 mL flush bag   Intravenous PRN Jad Walker MD         Current Outpatient Medications on File Prior to Encounter   Medication Sig Dispense Refill    ketorolac (TORADOL) 10 mg tablet Take 10 mg by  mouth every 6 (six) hours.      NP THYROID 30 mg Tab Take 30 mg by mouth once daily.      omeprazole (PRILOSEC) 40 MG capsule Take 40 mg by mouth as needed.      prenatal vit calc,iron,folic (PRENATAL VITAMIN ORAL) Take by mouth.      cholecalciferol, vitamin D3, (VITAMIN D3) 50 mcg (2,000 unit) Cap Take 3 capsules by mouth once daily.       dicyclomine (BENTYL) 20 mg tablet Take 1 tablet (20 mg total) by mouth 2 (two) times daily. 60 tablet 0    ferrous sulfate 325 (65 FE) MG EC tablet Take 325 mg by mouth once daily.      fluticasone (FLONASE) 50 mcg/actuation nasal spray 1 spray (50 mcg total) by Each Nare route once daily. 16 g 0    HYDROcodone-acetaminophen (NORCO)  mg per tablet Take 1 tablet by mouth.      lactobacillus comb no.10 (PROBIOTIC) 20 billion cell Cap Take by mouth.      MAGNESIUM CITRATE ORAL Take 250 mg by mouth once daily.      magnesium oxide 400 mg magnesium Cap Take by mouth once.      omega-3 fatty acids/fish oil (FISH OIL-OMEGA-3 FATTY ACIDS) 300-1,000 mg capsule Take by mouth once daily.      progesterone (PROMETRIUM) 200 MG capsule Take 300 mg by mouth.      rifAXIMin (XIFAXAN) 550 mg Tab Take 1 tablet (550 mg total) by mouth 3 (three) times daily. 42 tablet 2    TURMERIC ORAL Take by mouth.      UNABLE TO FIND 1 tablet once daily. Coenzyme B6, P-5-P      UNABLE TO FIND 2,000 mg once daily. eric-inositol      UNABLE TO FIND HCG injections         Review of patient's allergies indicates:  No Known Allergies    Review of Systems:   Constitutional: no fever, chills or change in weight   Eyes: no visual changes   ENT: no sore throat or dysphagia  Respiratory: no cough or shortness of breath   Cardiovascular: no chest pain or palpitations   Gastrointestinal: as per HPI  Hematologic/Lymphatic: no easy bruising or lymphadenopathy   Musculoskeletal: no arthralgias or myalgias   Neurological: no change in mental status  Behavioral/Psych: no change in mood    Objective:      Vitals:    03/04/22 1100   BP: 127/72   Pulse: 86   Resp: 18   Temp: 98.3 °F (36.8 °C)       General: Alert and Oriented, no distress  HEENT: Normocephalic, Atraumatic. No scleral icterus.  Resp:  Effort normal  Cardiac: RRR  Abdomen: Normoactive bowel sounds. Non-distended. Soft. TTP epigastric area.  Extremities: No peripheral edema.   Neurologic: No gross neurological Deficits  Psych: Calm, cooperative. Normal mood and affect.    Significant Labs:  Recent Labs   Lab 03/04/22  0740   HGB 12.3       Lab Results   Component Value Date    WBC 13.50 (H) 03/04/2022    HGB 12.3 03/04/2022    HCT 38.8 03/04/2022    MCV 83 03/04/2022     03/04/2022       Lab Results   Component Value Date     03/04/2022    K 4.3 03/04/2022     03/04/2022    CO2 22 (L) 03/04/2022    BUN 11 03/04/2022    CREATININE 0.6 03/04/2022    CALCIUM 9.7 03/04/2022    ANIONGAP 13 03/04/2022    ESTGFRAFRICA >60.0 03/04/2022    EGFRNONAA >60.0 03/04/2022       Lab Results   Component Value Date    ALT 23 03/04/2022    AST 17 03/04/2022    ALKPHOS 136 (H) 03/04/2022    BILITOT 0.7 03/04/2022       No results found for: INR    Significant Imaging:  Reviewed pertinent radiology findings.       Assessment/Plan:     Heidi Zhang Caro is a 31 y.o. 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    Problem List:  1. Acute pancreatitis  2. Pancreatic duct stent in place    Plan:  - plan for EGD today for PD stent removal  - supportive care for pancreatitis: LR at 250cc/hr for first 24 hours, clear liquids as tolerated, anti-emetics and pain control prn    Thank you for involving us in the care of Heidi Zhang Caro. Please call with any additional questions, concerns or changes in the patient's clinical status.    Moe Jose MD  Gastroenterology Fellow PGY VI   [facility]

## 2022-03-04 NOTE — NURSING TRANSFER
Nursing Transfer Note      3/4/2022     Transfer To: [id number]    Transfer via stretcher    Transported by pct    Medicines sent: none    Chart send with patient: Yes    Notified: spouse    Patient reassessed at:1531    Upon arrival to floor: report called to Urbano NAJERA

## 2022-03-04 NOTE — MEDICAL/APP STUDENT
HISTORY & PHYSICAL  Hospital Medicine    Team: [facility]    PRESENTING HISTORY     Chief Complaint/Reason for Admission:  Abdominal Pain    History of Present Illness:  Ms. Heidi Zhang Caro is a 31 y.o. female with a past medical history of post ERCP acute pancreatitis, duodenal mass (tubular adenoma), pancreatic stent, GERD, ectopic pregnancy and PCOS who presented to ED on 3/4 with abdominal pain.     The patient underwent an endoscopy in December 2021 which demonstrated a duodenal tubular adenoma. On 1/28 the patient had an ERCP preformed at [facility] in which the adenoma was removed and pancreatic stent placed. The evening of hospital discharge Ms. Haynes represented to [facility] for acute pancreatitis. She was discharged on 2/1. Following discharge Ms. Haynes had an IBS flare. She saw Dr. Avilez on 2/16 who started her on a new Rifaximin medication.     Onset of abdominal discomfort was one week ago. Patient reports pain worsened over the two days prior to hospital presentation. Pain is described as epigastric and band like over the upper abdomen. The pain is a constant 6/10 with fluctuations up to 9/10.  Pain is worsened with eating, drinking or deep inhilation. Patient also reports nausea and vomited on arrival to ED. Patient reports trying Toradol and [place] at home with no improvement. Ms. Haynes reports minimal food or drink ingestion over the past 2 days due to aggravation of symptoms. Patient denies fever, sweating, chills, palpitations or SOB. She reports alcohol consumption of 12 beers over 2 days during [place]. Patient attributes current symptoms to the new Rifaximin medication or the pancreatic stent.     Review of Systems:  Review of Systems   Constitutional: Negative.    Respiratory: Negative for cough, hemoptysis, sputum production, shortness of breath and wheezing.    Cardiovascular: Negative for chest pain, palpitations and leg swelling.   Gastrointestinal: Positive for  abdominal pain, nausea and vomiting. Negative for blood in stool, constipation, diarrhea, heartburn and melena.        Patient reports eructation   Genitourinary: Negative.    Musculoskeletal: Negative.    Skin: Negative.    Neurological: Negative for dizziness, sensory change, weakness and headaches.       PAST HISTORY:     Past Medical History:   Diagnosis Date    Acute pancreatitis     Ectopic pregnancy     Endometriosis     GERD (gastroesophageal reflux disease)     H/O chlamydia infection     Hiatal hernia 07/2021    IBS (irritable bowel syndrome)     Insulin resistance     Kidney stone     PCOS (polycystic ovarian syndrome)     PONV (postoperative nausea and vomiting)        Past Surgical History:   Procedure Laterality Date    COLONOSCOPY N/A 12/29/2021    Procedure: COLONOSCOPY Patient had a positive covid test on 12/6/22. She will bring positive results with her to the hospital.;  Surgeon: Davi Baldwin MD;  Location: [facility];  Service: Endoscopy;  Laterality: N/A;    CYSTOSCOPY W/ RETROGRADES Left 7/1/2021    Procedure: CYSTOSCOPY, WITH RETROGRADE PYELOGRAM;  Surgeon: Sam Griffiths MD;  Location: [facility] OR;  Service: Urology;  Laterality: Left;    DIAGNOSTIC LAPAROSCOPY      DILATION AND CURETTAGE OF UTERUS      ENDOSCOPIC ULTRASOUND OF UPPER GASTROINTESTINAL TRACT N/A 1/27/2022    Procedure: ULTRASOUND, UPPER GI TRACT, ENDOSCOPIC;  Surgeon: Jim Velazquez MD;  Location: [address]);  Service: Endoscopy;  Laterality: N/A;  [facility]  inst[facility] sent via portal-BB    ERCP N/A 1/27/2022    Procedure: ERCP (ENDOSCOPIC RETROGRADE CHOLANGIOPANCREATOGRAPHY);  Surgeon: Jim Velazquez MD;  Location: [address]);  Service: Endoscopy;  Laterality: N/A;  covid-1/24/22[facility]  instructions sent via portal-BB    ESOPHAGOGASTRODUODENOSCOPY N/A 12/29/2021    Procedure: EGD (ESOPHAGOGASTRODUODENOSCOPY);  Surgeon: Davi Baldwin MD;  Location: [facility]  END[facility];  Service: Endoscopy;  Laterality: N/A;    EXTRACORPOREAL SHOCK WAVE LITHOTRIPSY Left 7/1/2021    Procedure: LITHOTRIPSY, ESWL Attempted;  Surgeon: Sam Griffiths MD;  Location: [facility];  Service: Urology;  Laterality: Left;    INTERNAL URETHROTOMY N/A 7/1/2021    Procedure: URETHROTOMY, INTERNAL;  Surgeon: Sam Griffiths MD;  Location: [facility];  Service: Urology;  Laterality: N/A;    LASER LITHOTRIPSY Left 7/1/2021    Procedure: LITHOTRIPSY, USING LASER;  Surgeon: Sam Griffiths MD;  Location: [facility];  Service: Urology;  Laterality: Left;    polyp removed      stent to Pancreas      TONSILLECTOMY      URETEROSCOPY Left 7/1/2021    Procedure: URETEROSCOPY;  Surgeon: Sam Griffiths MD;  Location: [facility] OR;  Service: Urology;  Laterality: Left;    WISDOM TOOTH EXTRACTION         Family History   Problem Relation Age of Onset    Diabetes Father     Hypertension Mother     Diabetes Paternal Uncle     Heart disease Maternal Grandfather     Cancer Paternal Grandmother         leukemia    Diabetes Paternal Grandfather     Breast cancer Paternal Aunt     Colon cancer Neg Hx     Ovarian cancer Neg Hx     Glaucoma Neg Hx     Amblyopia Neg Hx     Blindness Neg Hx     Macular degeneration Neg Hx     Retinal detachment Neg Hx     Strabismus Neg Hx        Social History     Socioeconomic History    Marital status:    Occupational History     Employer: [employer] [place]   Tobacco Use    Smoking status: Never Smoker    Smokeless tobacco: Never Used   Substance and Sexual Activity    Alcohol use: Yes     Alcohol/week: 0.0 standard drinks     Comment: socially, reports drinking 12 beers over 2 days of [place]    Drug use: No    Sexual activity: Yes     Partners: Male     Birth control/protection: None       MEDICATIONS & ALLERGIES:     No current facility-administered medications on file prior to encounter.     Current Outpatient Medications on File Prior to Encounter    Medication Sig Dispense Refill    cholecalciferol, vitamin D3, (VITAMIN D3) 50 mcg (2,000 unit) Cap Take 3 capsules by mouth once daily.       dicyclomine (BENTYL) 20 mg tablet Take 1 tablet (20 mg total) by mouth 2 (two) times daily. 60 tablet 0    ferrous sulfate 325 (65 FE) MG EC tablet Take 325 mg by mouth once daily.      HYDROcodone-acetaminophen (NORCO)  mg per tablet Take 1 tablet by mouth as needed for Pain.      ketorolac (TORADOL) 10 mg tablet Take 10 mg by mouth every 6 (six) hours.      lactobacillus comb no.10 (PROBIOTIC) 20 billion cell Cap Take 1 capsule by mouth once daily.      magnesium oxide 400 mg magnesium Cap Take 1 capsule by mouth once daily.      NP THYROID 30 mg Tab Take 30 mg by mouth once daily.      omega-3 fatty acids/fish oil (FISH OIL-OMEGA-3 FATTY ACIDS) 300-1,000 mg capsule Take by mouth once daily.      omeprazole (PRILOSEC) 40 MG capsule Take 40 mg by mouth as needed.      prenatal vit calc,iron,folic (PRENATAL VITAMIN ORAL) Take 1 tablet by mouth once daily.      progesterone (PROMETRIUM) 200 MG capsule Take 300 mg by mouth.      rifAXIMin (XIFAXAN) 550 mg Tab Take 1 tablet (550 mg total) by mouth 3 (three) times daily. 42 tablet 2    TURMERIC ORAL Take by mouth once daily.      ubidecarenone (COENZYME Q10 ORAL) Take 1 tablet by mouth once daily.      UNABLE TO FIND 1 tablet once daily. Coenzyme B6, P-5-P      UNABLE TO FIND 2,000 mg once daily. eric-inositol      UNABLE TO FIND HCG injections      [DISCONTINUED] fluticasone (FLONASE) 50 mcg/actuation nasal spray 1 spray (50 mcg total) by Each Nare route once daily. 16 g 0    [DISCONTINUED] MAGNESIUM CITRATE ORAL Take 250 mg by mouth once daily.          Review of patient's allergies indicates:  No Known Allergies    OBJECTIVE:     Vital Signs:  Temp:  [98.2 °F (36.8 °C)-98.8 °F (37.1 °C)] 98.2 °F (36.8 °C)  Pulse:  [77-92] 77  Resp:  [16-24] 18  SpO2:  [94 %-100 %] 98 %  BP: (125-153)/(67-99)  138/82  Body mass index is 49.38 kg/m².     Physical Exam:  Physical Exam  Constitutional:       Appearance: Normal appearance. She is obese.   HENT:      Head: Normocephalic.      Mouth/Throat:      Mouth: Mucous membranes are moist.      Pharynx: No posterior oropharyngeal erythema.   Cardiovascular:      Rate and Rhythm: Normal rate and regular rhythm.      Heart sounds: Normal heart sounds.   Pulmonary:      Effort: Pulmonary effort is normal.      Breath sounds: Normal breath sounds.   Abdominal:      General: Bowel sounds are normal.      Palpations: Abdomen is soft. There is no mass.      Tenderness: There is abdominal tenderness in the epigastric area. There is no guarding or rebound. Negative signs include Rovsing's sign and McBurney's sign.   Musculoskeletal:         General: No swelling or tenderness.   Skin:     General: Skin is warm and dry.      Capillary Refill: Capillary refill takes less than 2 seconds.   Neurological:      Mental Status: She is alert and oriented to person, place, and time.      Cranial Nerves: No cranial nerve deficit.      Motor: No weakness.         Laboratory  Lab Results   Component Value Date    WBC 13.50 (H) 03/04/2022    HGB 12.3 03/04/2022    HCT 38.8 03/04/2022    MCV 83 03/04/2022     03/04/2022     Recent Labs   Lab 03/04/22  0740         K 4.3      CO2 22*   BUN 11   CREATININE 0.6   CALCIUM 9.7     No results found for: INR, PROTIME  Lab Results   Component Value Date    HGBA1C 5.3 09/04/2014     No results for input(s): POCTGLUCOSE in the last 72 hours.     Recent Labs   Lab 03/04/22  0740   ALT 23   AST 17   ALKPHOS 136*   BILITOT 0.7   PROT 7.6   ALBUMIN 3.7     Recent Labs (3/4):  Lipase - 987  Triglycerides - 66    Recent Labs   Lab 03/04/22  0741   COLORU Yellow   SPECGRAV 1.020   PHUR 6.0   PROTEINUA Negative   BACTERIA Rare       Negative COVID PCR (3/4)      Diagnostic Results:    CT Abdomen Pelvis With Contrast   Final Result  "  Abnormal      1. Pancreatic stent in place with mild peripancreatic fat stranding similar to CT 01/28/2022 and suggestive of ongoing acute pancreatitis.   2. Bilateral nonobstructing nephrolithiasis.   3. Additional findings as above.   This report was flagged in Epic as abnormal.      Electronically signed by resident: Andrew Walker   Date:    03/04/2022   Time:    08:21      Electronically signed by: Favio Persaud MD   Date:    03/04/2022   Time:    09:03        EKG (3/4): "Normal sinus rhythm with sinus arrhythmia   Nonspecific T wave abnormality   Abnormal ECG   When compared with ECG of 28-JAN-2022 00:21,   Nonspecific T wave abnormality now evident in Anterior-lateral leads"      Procedures    Endoscopy (3/4): "The esophagus was normal. The stomach was normal. There is no endoscopic evidence of polyps in the ampulla. A previously placed pancreatic stent was seen in the ampulla. Stent  removal was accomplished with a snare."    ASSESSMENT & PLAN:     Current Problems List:  Active Hospital Problems    Diagnosis  POA    *Acute pancreatitis [K85.90]  Yes    GERD (gastroesophageal reflux disease) [K21.9]  Unknown    Irritable bowel syndrome with diarrhea [K58.0]  Unknown    Hypothyroidism [E03.9]  Yes      Resolved Hospital Problems   No resolved problems to display.       Problem Assessment & Treatment Plan:    1. Acute Pancreatitis: history of ampullary fibrous adenoma which was removed, Hx post ERCP acute pancreatitis and pancreaditic stent placed 1/26, presented with 2 day hx of upper abdominal pain, CT abdo pelvis on 3/4 showed peripancreatic fat stranding consistent with acute pancreatitis, lipase 987 on admission to ED   -endoscopy preformed on 3/4, pancreatic stent removed    -received 1L bolus normal saline, continue 250 mL/h LR   -oxycodone and dilaudid PRN for pain management    -ondansetron and prochlorperazine PRN for nausea/vomiting   -clear fluid diet, advance as tolerated     2. GERD   -continue " home PRN meds (omeprazole, magnesium oxide)    3. IBS   -continue home med (refixamin)     4. Hypothyroidism   -continue home med (thyroid pork tablet)    5. Bilateral nonobstructive nephrolithiasis: visualized on CT abdo pelvis (3/4), n   -IV fluids    DVT PPX:  Anticoagulants   Medication Route Frequency    enoxaparin injection 40 mg Subcutaneous Q12H         Signing MS3:  Laura Dorsey

## 2022-03-04 NOTE — ED NOTES
Patient states in January had endoscope, with pancreatitis and IBS. States mid upper abd pain after eating or drinking x 2 days. Reports nausea with eating

## 2022-03-04 NOTE — HPI
Heidi Haynes is a 30 y/o F with a PMH of ampullary adenoma s/p ERCP and pancreatic stent placement, post-ERCP pancreatitis, IBS, hypothyroidism, GERD, PCOS who presents with abdominal pain. She was recently admitted at [place] 1/28-2/1 for post-ERCP pancreatitis and was feeling better after discharge. She reports that she began having pain across her upper abdomen two days ago. The pain is constant with waves of more severe pain. She reports that the pain worsens with eating and drinking. She has tried toradol without relief. She reports nausea/vomiting that began while in the ED. She denies fevers/chills. She reports recent alcohol use of 7 beers on [place] but states that she otherwise drinks socially.    In the ED, T98.8, /99, HR 92, SpO2 99%. Labs notable for WBC 13.5, lipase 987. CT abdomen/pelvis showed pancreatic stent in place with mild peripancreatic fat stranding similar to CT 01/28/2022 and suggestive of ongoing acute pancreatitis. Admitted to hospital medicine for further management.

## 2022-03-04 NOTE — ANESTHESIA POSTPROCEDURE EVALUATION
Anesthesia Post Evaluation    Patient: Heiid Zhang Caro    Procedure(s) Performed: Procedure(s) (LRB):  EGD (ESOPHAGOGASTRODUODENOSCOPY) (N/A)    Final Anesthesia Type: general      Patient location during evaluation: PACU  Patient participation: Yes- Able to Participate  Level of consciousness: awake and alert and oriented  Post-procedure vital signs: reviewed and stable  Pain management: adequate  Airway patency: patent    PONV status at discharge: No PONV  Anesthetic complications: no      Cardiovascular status: hemodynamically stable  Respiratory status: unassisted, spontaneous ventilation and room air  Hydration status: euvolemic  Follow-up not needed.          Vitals Value Taken Time   /71 03/04/22 1450   Pulse 88 03/04/22 1502   Resp 17 03/04/22 1502   SpO2 93 % 03/04/22 1502   Vitals shown include unvalidated device data.      No case tracking events are documented in the log.      Pain/Yoni Score: Pain Rating Prior to Med Admin: 7 (3/4/2022 10:24 AM)

## 2022-03-04 NOTE — ASSESSMENT & PLAN NOTE
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 Stent removal had been planned for 3/18. Afebrile and hemodynamically stable. Lipase 987. CT abdomen pelvis shows acute pancreatitis.   -  mL/hr  - oxycodone 5 mg q4h for moderate pain  - dilaudid 1 mg IV q6h for breakthrough pain  - prn zofran for nausea  - check triglycerides; wnl   - AES consult, appreciate recs    - plan for EGD today for PD stent removal  - NPO

## 2022-03-04 NOTE — SUBJECTIVE & OBJECTIVE
Past Medical History:   Diagnosis Date    Acute pancreatitis     Ectopic pregnancy     Endometriosis     GERD (gastroesophageal reflux disease)     H/O chlamydia infection     Hiatal hernia 07/2021    IBS (irritable bowel syndrome)     Insulin resistance     Kidney stone     PCOS (polycystic ovarian syndrome)     PONV (postoperative nausea and vomiting)        Past Surgical History:   Procedure Laterality Date    COLONOSCOPY N/A 12/29/2021    Procedure: COLONOSCOPY Patient had a positive covid test on 12/6/22. She will bring positive results with her to the hospital.;  Surgeon: Davi Baldwin MD;  Location: [facility];  Service: Endoscopy;  Laterality: N/A;    CYSTOSCOPY W/ RETROGRADES Left 7/1/2021    Procedure: CYSTOSCOPY, WITH RETROGRADE PYELOGRAM;  Surgeon: Sam Griffiths MD;  Location: [facility];  Service: Urology;  Laterality: Left;    DIAGNOSTIC LAPAROSCOPY      DILATION AND CURETTAGE OF UTERUS      ENDOSCOPIC ULTRASOUND OF UPPER GASTROINTESTINAL TRACT N/A 1/27/2022    Procedure: ULTRASOUND, UPPER GI TRACT, ENDOSCOPIC;  Surgeon: Jim Velazquez MD;  Location: [address]);  Service: Endoscopy;  Laterality: N/A;  covid-1/24/22[facility]  instructions sent via portal-BB    ERCP N/A 1/27/2022    Procedure: ERCP (ENDOSCOPIC RETROGRADE CHOLANGIOPANCREATOGRAPHY);  Surgeon: Jim Velazquez MD;  Location: [address]);  Service: Endoscopy;  Laterality: N/A;  covid-1/24/22[facility]  instructions sent via portal-BB    ESOPHAGOGASTRODUODENOSCOPY N/A 12/29/2021    Procedure: EGD (ESOPHAGOGASTRODUODENOSCOPY);  Surgeon: Davi Baldwin MD;  Location: [facility];  Service: Endoscopy;  Laterality: N/A;    EXTRACORPOREAL SHOCK WAVE LITHOTRIPSY Left 7/1/2021    Procedure: LITHOTRIPSY, ESWL Attempted;  Surgeon: Sam Griffiths MD;  Location: [facility];  Service: Urology;  Laterality: Left;    INTERNAL URETHROTOMY N/A 7/1/2021    Procedure: URETHROTOMY, INTERNAL;  Surgeon: Sam Griffiths MD;  Location:  [facility];  Service: Urology;  Laterality: N/A;    LASER LITHOTRIPSY Left 7/1/2021    Procedure: LITHOTRIPSY, USING LASER;  Surgeon: Sam Griffiths MD;  Location: [facility];  Service: Urology;  Laterality: Left;    polyp removed      stent to Pancreas      TONSILLECTOMY      URETEROSCOPY Left 7/1/2021    Procedure: URETEROSCOPY;  Surgeon: Sam Griffiths MD;  Location: [facility];  Service: Urology;  Laterality: Left;    WISDOM TOOTH EXTRACTION         Review of patient's allergies indicates:  No Known Allergies    Current Facility-Administered Medications on File Prior to Encounter   Medication    acetaminophen tablet 650 mg    albuterol inhaler 2 puff    diphenhydrAMINE injection 25 mg    EPINEPHrine (EPIPEN) 0.3 mg/0.3 mL pen injection 0.3 mg    methylPREDNISolone sodium succinate injection 40 mg    ondansetron disintegrating tablet 4 mg    sodium chloride 0.9% 500 mL flush bag     Current Outpatient Medications on File Prior to Encounter   Medication Sig    ketorolac (TORADOL) 10 mg tablet Take 10 mg by mouth every 6 (six) hours.    NP THYROID 30 mg Tab Take 30 mg by mouth once daily.    omeprazole (PRILOSEC) 40 MG capsule Take 40 mg by mouth as needed.    prenatal vit calc,iron,folic (PRENATAL VITAMIN ORAL) Take by mouth.    cholecalciferol, vitamin D3, (VITAMIN D3) 50 mcg (2,000 unit) Cap Take 3 capsules by mouth once daily.     dicyclomine (BENTYL) 20 mg tablet Take 1 tablet (20 mg total) by mouth 2 (two) times daily.    ferrous sulfate 325 (65 FE) MG EC tablet Take 325 mg by mouth once daily.    fluticasone (FLONASE) 50 mcg/actuation nasal spray 1 spray (50 mcg total) by Each Nare route once daily.    HYDROcodone-acetaminophen (NORCO)  mg per tablet Take 1 tablet by mouth.    lactobacillus comb no.10 (PROBIOTIC) 20 billion cell Cap Take by mouth.    MAGNESIUM CITRATE ORAL Take 250 mg by mouth once daily.    magnesium oxide 400 mg magnesium Cap Take by mouth once.    omega-3 fatty acids/fish oil (FISH  OIL-OMEGA-3 FATTY ACIDS) 300-1,000 mg capsule Take by mouth once daily.    progesterone (PROMETRIUM) 200 MG capsule Take 300 mg by mouth.    rifAXIMin (XIFAXAN) 550 mg Tab Take 1 tablet (550 mg total) by mouth 3 (three) times daily.    TURMERIC ORAL Take by mouth.    UNABLE TO FIND 1 tablet once daily. Coenzyme B6, P-5-P    UNABLE TO FIND 2,000 mg once daily. eric-inositol    UNABLE TO FIND HCG injections     Family History       Problem Relation (Age of Onset)    Breast cancer Paternal Aunt    Cancer Paternal Grandmother    Diabetes Father, Paternal Uncle, Paternal Grandfather    Heart disease Maternal Grandfather    Hypertension Mother          Tobacco Use    Smoking status: Never Smoker    Smokeless tobacco: Never Used   Substance and Sexual Activity    Alcohol use: Yes     Alcohol/week: 0.0 standard drinks     Comment: socially    Drug use: No    Sexual activity: Yes     Partners: Male     Birth control/protection: None     Review of Systems   Constitutional:  Positive for appetite change. Negative for chills and fever.   HENT:  Negative for sore throat and trouble swallowing.    Eyes:  Negative for visual disturbance.   Respiratory:  Negative for cough and shortness of breath.    Cardiovascular:  Negative for chest pain and leg swelling.   Gastrointestinal:  Positive for abdominal pain, nausea and vomiting. Negative for constipation and diarrhea.   Genitourinary:  Negative for difficulty urinating.   Musculoskeletal:  Negative for gait problem.   Neurological:  Negative for dizziness and weakness.   Psychiatric/Behavioral:  Negative for agitation and confusion.    Objective:     Vital Signs (Most Recent):  Temp: 98.3 °F (36.8 °C) (03/04/22 1100)  Pulse: 86 (03/04/22 1100)  Resp: 18 (03/04/22 1100)  BP: 127/72 (03/04/22 1100)  SpO2: 99 % (03/04/22 1100) Vital Signs (24h Range):  Temp:  [98.3 °F (36.8 °C)-98.8 °F (37.1 °C)] 98.3 °F (36.8 °C)  Pulse:  [79-92] 86  Resp:  [16-18] 18  SpO2:  [97 %-99 %] 99 %  BP:  (127-153)/(72-99) 127/72     Weight: 122.5 kg (270 lb)  Body mass index is 49.38 kg/m².    Physical Exam  Constitutional:       Appearance: She is obese. She is not toxic-appearing.   HENT:      Head: Normocephalic and atraumatic.      Nose: Nose normal.   Eyes:      Extraocular Movements: Extraocular movements intact.      Conjunctiva/sclera: Conjunctivae normal.   Cardiovascular:      Rate and Rhythm: Normal rate and regular rhythm.      Heart sounds: Normal heart sounds. No murmur heard.  Pulmonary:      Effort: Pulmonary effort is normal. No respiratory distress.      Breath sounds: Normal breath sounds. No rhonchi.   Abdominal:      General: Bowel sounds are normal. There is no distension.      Palpations: Abdomen is soft.      Tenderness: There is abdominal tenderness (primarily upper abdomen). There is no guarding.   Musculoskeletal:      Cervical back: Normal range of motion.      Right lower leg: No edema.      Left lower leg: No edema.   Skin:     General: Skin is warm and dry.   Neurological:      General: No focal deficit present.      Mental Status: She is alert and oriented to person, place, and time.   Psychiatric:         Mood and Affect: Mood normal.         Behavior: Behavior normal.           Significant Labs: All pertinent labs within the past 24 hours have been reviewed.  CBC:   Recent Labs   Lab 03/04/22  0740   WBC 13.50*   HGB 12.3   HCT 38.8        CMP:   Recent Labs   Lab 03/04/22  0740      K 4.3      CO2 22*      BUN 11   CREATININE 0.6   CALCIUM 9.7   PROT 7.6   ALBUMIN 3.7   BILITOT 0.7   ALKPHOS 136*   AST 17   ALT 23   ANIONGAP 13   EGFRNONAA >60.0     Lipase:   Recent Labs   Lab 03/04/22  0740   LIPASE 987*       Significant Imaging: I have reviewed all pertinent imaging results/findings within the past 24 hours.

## 2022-03-04 NOTE — NURSING
Patient arrived to floor via stretcher. Ambulated to bed in room without any difficulties. Patient  is at bedside. Assessed patient respirations even and unlabored. Vitals stable. Can voice needs and pain. Patient C/O pain noted on scale 1 - 10 states pain is a 4 oxycodone 5 mg administered by mouth. Safety measures is in place call light within reach.

## 2022-03-04 NOTE — ANESTHESIA PREPROCEDURE EVALUATION
03/04/2022  Heidi Zhang Caro is a 31 y.o., female with acute pancretitis with a stent now for re-evaluation for her stent.     Pre-op Assessment    I have reviewed the Patient Summary Reports.    I have reviewed the Nursing Notes. I have reviewed the NPO Status.   I have reviewed the Medications.     Review of Systems  Anesthesia Hx:  Hx of Anesthetic complications PONV History of prior surgery of interest to airway management or planning: Personal Hx of Anesthesia complications, Post-Operative Nausea/Vomiting, in the past, but not with recent anesthetics / prophylaxis   Cardiovascular:   Exercise tolerance: good    Renal/:   Chronic Renal Disease    Hepatic/GI:   Hiatal Hernia, GERD    Endocrine:  Metabolic Disorders, Morbid Obesity / BMI > 40      Physical Exam  General:  Well nourished and Morbid Obesity      Airway/Jaw/Neck:  Airway Findings: Mouth Opening: Normal   Tongue: Normal   General Airway Assessment: Adult Mallampati: II  TM Distance: Normal, at least 6 cm       Dental:  Dental Findings: In tact     Chest/Lungs:  Chest/Lungs Clear    Heart/Vascular:  Heart Findings: Normal            Anesthesia Plan  Type of Anesthesia, risks & benefits discussed:  Anesthesia Type:  Gen Natural Airway    Patient's Preference:   Plan Factors:          Intra-op Monitoring Plan: standard ASA monitors  Intra-op Monitoring Plan Comments:   Post Op Pain Control Plan: multimodal analgesia, IV/PO Opioids PRN and per primary service following discharge from PACU  Post Op Pain Control Plan Comments:     Induction:   IV  Beta Blocker:  Patient is not currently on a Beta-Blocker (No further documentation required).       Informed Consent: Informed consent signed with the Patient and all parties understand the risks and agree with anesthesia plan.  All questions answered.    ASA Score: 3   Emergent   Day of Surgery Review  of History & Physical:        Anesthesia Plan Notes: Emergency EGD        Ready For Surgery From Anesthesia Perspective.           Physical Exam  General: Well nourished and Morbid Obesity    Airway:  Mallampati: II   Mouth Opening: Normal  TM Distance: Normal, at least 6 cm  Tongue: Normal    Dental:  In tact          Anesthesia Plan  Type of Anesthesia, risks & benefits discussed:    Anesthesia Type: Gen Natural Airway  Intra-op Monitoring Plan: standard ASA monitors  Post Op Pain Control Plan: multimodal analgesia, IV/PO Opioids PRN and per primary service following discharge from PACU  Induction:  IV  Informed Consent: Informed consent signed with the Patient and all parties understand the risks and agree with anesthesia plan.  All questions answered.   ASA Score: 3 Emergent  Anesthesia Plan Notes: Emergency EGD    Ready For Surgery From Anesthesia Perspective.       .

## 2022-03-05 VITALS
HEIGHT: 62 IN | WEIGHT: 270 LBS | TEMPERATURE: 97 F | HEART RATE: 68 BPM | RESPIRATION RATE: 17 BRPM | OXYGEN SATURATION: 98 % | BODY MASS INDEX: 49.69 KG/M2 | DIASTOLIC BLOOD PRESSURE: 74 MMHG | SYSTOLIC BLOOD PRESSURE: 132 MMHG

## 2022-03-05 LAB
ALBUMIN SERPL BCP-MCNC: 3.3 G/DL (ref 3.5–5.2)
ALP SERPL-CCNC: 123 U/L (ref 55–135)
ALT SERPL W/O P-5'-P-CCNC: 17 U/L (ref 10–44)
ANION GAP SERPL CALC-SCNC: 9 MMOL/L (ref 8–16)
AST SERPL-CCNC: 14 U/L (ref 10–40)
BASOPHILS # BLD AUTO: 0.02 K/UL (ref 0–0.2)
BASOPHILS NFR BLD: 0.2 % (ref 0–1.9)
BILIRUB SERPL-MCNC: 0.8 MG/DL (ref 0.1–1)
BUN SERPL-MCNC: 5 MG/DL (ref 6–20)
CALCIUM SERPL-MCNC: 9.6 MG/DL (ref 8.7–10.5)
CHLORIDE SERPL-SCNC: 102 MMOL/L (ref 95–110)
CO2 SERPL-SCNC: 26 MMOL/L (ref 23–29)
CREAT SERPL-MCNC: 0.7 MG/DL (ref 0.5–1.4)
DIFFERENTIAL METHOD: ABNORMAL
EOSINOPHIL # BLD AUTO: 0.1 K/UL (ref 0–0.5)
EOSINOPHIL NFR BLD: 1.1 % (ref 0–8)
ERYTHROCYTE [DISTWIDTH] IN BLOOD BY AUTOMATED COUNT: 14.7 % (ref 11.5–14.5)
EST. GFR  (AFRICAN AMERICAN): >60 ML/MIN/1.73 M^2
EST. GFR  (NON AFRICAN AMERICAN): >60 ML/MIN/1.73 M^2
GLUCOSE SERPL-MCNC: 86 MG/DL (ref 70–110)
HCT VFR BLD AUTO: 36.3 % (ref 37–48.5)
HGB BLD-MCNC: 11.4 G/DL (ref 12–16)
IMM GRANULOCYTES # BLD AUTO: 0.04 K/UL (ref 0–0.04)
IMM GRANULOCYTES NFR BLD AUTO: 0.4 % (ref 0–0.5)
LYMPHOCYTES # BLD AUTO: 2.6 K/UL (ref 1–4.8)
LYMPHOCYTES NFR BLD: 23.1 % (ref 18–48)
MAGNESIUM SERPL-MCNC: 1.7 MG/DL (ref 1.6–2.6)
MCH RBC QN AUTO: 26.6 PG (ref 27–31)
MCHC RBC AUTO-ENTMCNC: 31.4 G/DL (ref 32–36)
MCV RBC AUTO: 85 FL (ref 82–98)
MONOCYTES # BLD AUTO: 0.8 K/UL (ref 0.3–1)
MONOCYTES NFR BLD: 7.5 % (ref 4–15)
NEUTROPHILS # BLD AUTO: 7.6 K/UL (ref 1.8–7.7)
NEUTROPHILS NFR BLD: 67.7 % (ref 38–73)
NRBC BLD-RTO: 0 /100 WBC
PHOSPHATE SERPL-MCNC: 3.4 MG/DL (ref 2.7–4.5)
PLATELET # BLD AUTO: 269 K/UL (ref 150–450)
PMV BLD AUTO: 10.3 FL (ref 9.2–12.9)
POTASSIUM SERPL-SCNC: 4.3 MMOL/L (ref 3.5–5.1)
PROT SERPL-MCNC: 6.8 G/DL (ref 6–8.4)
RBC # BLD AUTO: 4.29 M/UL (ref 4–5.4)
SODIUM SERPL-SCNC: 137 MMOL/L (ref 136–145)
WBC # BLD AUTO: 11.15 K/UL (ref 3.9–12.7)

## 2022-03-05 PROCEDURE — 36415 COLL VENOUS BLD VENIPUNCTURE: CPT | Performed by: INTERNAL MEDICINE

## 2022-03-05 PROCEDURE — 99233 PR SUBSEQUENT HOSPITAL CARE,LEVL III: ICD-10-PCS | Mod: ,,, | Performed by: INTERNAL MEDICINE

## 2022-03-05 PROCEDURE — 83735 ASSAY OF MAGNESIUM: CPT | Performed by: INTERNAL MEDICINE

## 2022-03-05 PROCEDURE — 25000003 PHARM REV CODE 250

## 2022-03-05 PROCEDURE — 99233 SBSQ HOSP IP/OBS HIGH 50: CPT | Mod: ,,, | Performed by: INTERNAL MEDICINE

## 2022-03-05 PROCEDURE — 25000003 PHARM REV CODE 250: Performed by: INTERNAL MEDICINE

## 2022-03-05 PROCEDURE — 80053 COMPREHEN METABOLIC PANEL: CPT | Performed by: INTERNAL MEDICINE

## 2022-03-05 PROCEDURE — 84100 ASSAY OF PHOSPHORUS: CPT | Performed by: INTERNAL MEDICINE

## 2022-03-05 PROCEDURE — 85025 COMPLETE CBC W/AUTO DIFF WBC: CPT | Performed by: INTERNAL MEDICINE

## 2022-03-05 RX ORDER — OXYCODONE HYDROCHLORIDE 5 MG/1
5 TABLET ORAL EVERY 6 HOURS PRN
Qty: 12 TABLET | Refills: 0 | Status: SHIPPED | OUTPATIENT
Start: 2022-03-05 | End: 2022-03-05 | Stop reason: SDUPTHER

## 2022-03-05 RX ORDER — THYROID 30 MG/1
30 TABLET ORAL
Status: DISCONTINUED | OUTPATIENT
Start: 2022-03-05 | End: 2022-03-05 | Stop reason: HOSPADM

## 2022-03-05 RX ORDER — OXYCODONE HYDROCHLORIDE 5 MG/1
5 TABLET ORAL EVERY 6 HOURS PRN
Qty: 12 TABLET | Refills: 0 | Status: SHIPPED | OUTPATIENT
Start: 2022-03-05 | End: 2022-03-08

## 2022-03-05 RX ADMIN — OXYCODONE 5 MG: 5 TABLET ORAL at 04:03

## 2022-03-05 RX ADMIN — OXYCODONE 5 MG: 5 TABLET ORAL at 01:03

## 2022-03-05 RX ADMIN — RIFAXIMIN 550 MG: 550 TABLET ORAL at 04:03

## 2022-03-05 RX ADMIN — OXYCODONE 5 MG: 5 TABLET ORAL at 05:03

## 2022-03-05 RX ADMIN — RIFAXIMIN 550 MG: 550 TABLET ORAL at 08:03

## 2022-03-05 NOTE — HOSPITAL COURSE
Admitted to hospital medicine for management of acute pancreatitis. AES consulted and removed pancreatic duct stent on 3/4. Pt reports significant improvement in symptoms after procedure. Clear liquid diet started and advanced as tolerated. Discharged to home.

## 2022-03-05 NOTE — NURSING
C/g has RX for pain medication to take to their pharmacy  Pt discharged to home via wheelchair. Pt given prescriptions and copy of discharged papers instructions.. Pt educated on signs and symptoms of when to call the doctor. All belongings with the patient . Pt verbalized understanding.

## 2022-03-05 NOTE — SUBJECTIVE & OBJECTIVE
Interval History: NAEO. Pancreatic stent removed yesterday by AES. Pt reports significant improvement in pain after procedure. Minimal residual epigastric pain relieved by prns. She tolerated the clear liquid diet and would like to advance her diet this morning. She denies nausea/vomiting.    Review of Systems   Constitutional:  Positive for appetite change. Negative for chills and fever.   HENT:  Negative for sore throat and trouble swallowing.    Eyes:  Negative for visual disturbance.   Respiratory:  Negative for cough and shortness of breath.    Cardiovascular:  Negative for chest pain and leg swelling.   Gastrointestinal:  Positive for abdominal pain (improving). Negative for constipation, diarrhea, nausea and vomiting.   Genitourinary:  Negative for difficulty urinating.   Musculoskeletal:  Negative for gait problem.   Neurological:  Negative for dizziness and weakness.   Psychiatric/Behavioral:  Negative for agitation and confusion.    Objective:     Vital Signs (Most Recent):  Temp: 96.4 °F (35.8 °C) (03/05/22 1135)  Pulse: 70 (03/05/22 1135)  Resp: 18 (03/05/22 1135)  BP: (!) 139/91 (03/05/22 1135)  SpO2: 98 % (03/05/22 1135)   Vital Signs (24h Range):  Temp:  [96.3 °F (35.7 °C)-99 °F (37.2 °C)] 96.4 °F (35.8 °C)  Pulse:  [64-92] 70  Resp:  [6-24] 18  SpO2:  [94 %-100 %] 98 %  BP: (108-140)/(61-91) 139/91     Weight: 122.5 kg (270 lb)  Body mass index is 49.38 kg/m².    Intake/Output Summary (Last 24 hours) at 3/5/2022 1312  Last data filed at 3/5/2022 0600  Gross per 24 hour   Intake 440 ml   Output --   Net 440 ml      Physical Exam  Constitutional:       Appearance: She is obese. She is not toxic-appearing.   HENT:      Head: Normocephalic and atraumatic.      Nose: Nose normal.   Eyes:      Extraocular Movements: Extraocular movements intact.      Conjunctiva/sclera: Conjunctivae normal.   Cardiovascular:      Rate and Rhythm: Normal rate and regular rhythm.      Heart sounds: Normal heart sounds. No  murmur heard.  Pulmonary:      Effort: Pulmonary effort is normal. No respiratory distress.      Breath sounds: Normal breath sounds. No rhonchi.   Abdominal:      General: Bowel sounds are normal. There is no distension.      Palpations: Abdomen is soft.      Tenderness: There is abdominal tenderness (epigastric, improving). There is no guarding.   Musculoskeletal:      Cervical back: Normal range of motion.      Right lower leg: No edema.      Left lower leg: No edema.   Skin:     General: Skin is warm and dry.   Neurological:      General: No focal deficit present.      Mental Status: She is alert and oriented to person, place, and time.   Psychiatric:         Mood and Affect: Mood normal.         Behavior: Behavior normal.       Significant Labs: All pertinent labs within the past 24 hours have been reviewed.    Significant Imaging: I have reviewed all pertinent imaging results/findings within the past 24 hours.

## 2022-03-05 NOTE — MEDICAL/APP STUDENT
Progress Note  Hospital Medicine    Patient Name: Heidi Zhang Caro  YOB: 1990    Admit Date: 3/4/2022                     LOS: 1    SUBJECTIVE:     Reason for Admission:  Acute pancreatitis      Interval history: Mrs. Haynes had an endoscopy preformed on the afternoon of 3/4 with pancreatic stent removal. Patient reports a mild sore throat this morning but no trouble swallowing. Patient consumed jello and fluids postop and reports no discomfort/pain with intake. Diet was set at free fluids this morning. Patient expressed the desire to eat food. Patient reports minimal upper abdominal pain overnight with 0/10 pain after taking PRN oxycodone and 2/10 when the oxycodone wears off.    ROS: Review of Systems   Constitutional: Negative.    HENT: Positive for sore throat.    Respiratory: Negative for cough and shortness of breath.    Cardiovascular: Negative for chest pain, palpitations and leg swelling.   Gastrointestinal: Positive for abdominal pain. Negative for constipation, nausea and vomiting.   Genitourinary: Negative for dysuria, flank pain and urgency.       OBJECTIVE:     Scheduled Meds:   enoxparin  40 mg Subcutaneous Q12H    rifAXIMin  550 mg Oral TID    scopolamine  1 patch Transdermal Q3 Days    thyroid (pork)  30 mg Oral Before breakfast     Continuous Infusions:   lactated ringers 100 mL/hr at 03/05/22 1136     PRN Meds:.acetaminophen, dextrose 10%, dextrose 10%, glucagon (human recombinant), glucose, glucose, HYDROmorphone, naloxone, ondansetron, oxyCODONE, prochlorperazine, sodium chloride 0.9%, sodium chloride 0.9%    Vital Signs Range (Last 24H):  Temp:  [96.3 °F (35.7 °C)-99 °F (37.2 °C)]   Pulse:  [64-92]   Resp:  [6-24]   BP: (108-140)/(61-91)   SpO2:  [94 %-100 %] Body mass index is 49.38 kg/m².    I & O (Last 24H):    Intake/Output Summary (Last 24 hours) at 3/5/2022 1223  Last data filed at 3/5/2022 0600  Gross per 24 hour   Intake 440 ml   Output --   Net 440 ml     Physical  Exam  Constitutional:       Appearance: She is obese.   HENT:      Head: Normocephalic.      Mouth/Throat:      Mouth: Mucous membranes are moist.      Pharynx: Oropharynx is clear. No oropharyngeal exudate or posterior oropharyngeal erythema.   Cardiovascular:      Rate and Rhythm: Normal rate and regular rhythm.      Heart sounds: Normal heart sounds.   Pulmonary:      Breath sounds: Normal breath sounds.   Abdominal:      General: Bowel sounds are normal. There is no distension.      Palpations: Abdomen is soft. There is no mass.      Tenderness: There is abdominal tenderness. There is no guarding.   Skin:     General: Skin is warm and dry.   Neurological:      Mental Status: She is alert and oriented to person, place, and time.   Psychiatric:         Behavior: Behavior normal.         Thought Content: Thought content normal.         Diagnostic Results:  Lab Results   Component Value Date    WBC 11.15 03/05/2022    HGB 11.4 (L) 03/05/2022    HCT 36.3 (L) 03/05/2022    MCV 85 03/05/2022     03/05/2022     Recent Labs   Lab 03/05/22  0358   GLU 86      K 4.3      CO2 26   BUN 5*   CREATININE 0.7   CALCIUM 9.6   MG 1.7     No results found for: INR, PROTIME  Lab Results   Component Value Date    HGBA1C 5.3 09/04/2014     No results for input(s): POCTGLUCOSE in the last 72 hours.     Recent Labs (3/4):  Lipase - 987  Triglycerides - 66    ASSESSMENT:     Patient Active Problem List   Diagnosis    Right ankle pain    Nausea    Epigastric abdominal pain    Calculus of ureter    Microscopic hematuria    Renal colic    Adenomatous duodenal polyp    Acute pancreatitis    Hypothyroidism    S/P ERCP    GERD (gastroesophageal reflux disease)    Irritable bowel syndrome with diarrhea       PLAN:  Mrs. Haynes is a 31,y F with a pMHx post ERCP acute pancreatitis, duodenal mass, pancreatic stent, GERD and PCOS with acute pancreatitis with improving symptoms following ERCP and pancreatic stent  removal.    1. Acute Pancreatitis: history of ampullary fibrous adenoma which was removed, Hx post ERCP acute pancreatitis and pancreaditic stent placed 1/26, presented with 2 day hx of upper abdominal pain, CT abdo pelvis on 3/4 showed peripancreatic fat stranding consistent with acute pancreatitis, lipase 987 on admission to ED              -endoscopy preformed on 3/4, pancreatic stent removed               -continue 100 mL/h LR              -oxycodone and dilaudid PRN for pain management               -ondansetron and prochlorperazine PRN for nausea/vomiting              -full fluid diet, advance as tolerated      2. GERD: well controlled              -continue home PRN meds (omeprazole, magnesium oxide)     3. IBS: constant controlled by outpatient GI (Dr. Avilez)              -continue home med (refixamin)      4. Hypothyroidism: well controlled              -continue home med (thyroid pork tablet)     5. Bilateral nonobstructive nephrolithiasis: visualized on CT abdo pelvis (3/4), asymptomatic               -IV fluids    DVT PPX:  Anticoagulants   Medication Route Frequency    enoxaparin injection 40 mg Subcutaneous Q12H       DISCHARGE  -patient will return home following discharge where the patient lives with    -prior to discharge ensure patient only requires PO medications and can tolerate a full diet     Staff MS3:    Laura Dorsey

## 2022-03-05 NOTE — DISCHARGE SUMMARY
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 She was recently admitted at [facility] 1/28-2/1 for post-ERCP pancreatitis and was feeling better after discharge. She reports that she began having pain across her upper abdomen two days ago. The pain is constant with waves of more severe pain. She reports that the pain worsens with eating and drinking. She has tried toradol without relief. She reports nausea/vomiting that began while in the ED. She denies fevers/chills. She reports recent alcohol use of 7 beers on Mardi Gras but states that she otherwise drinks socially.    In the ED, T98.8, /99, HR 92, SpO2 99%. Labs notable for WBC 13.5, lipase 987. CT abdomen/pelvis showed pancreatic stent in place with mild peripancreatic fat stranding similar to CT 01/28/2022 and suggestive of ongoing acute pancreatitis. Admitted to hospital medicine for further management.       Procedure(s) (LRB):  EGD (ESOPHAGOGASTRODUODENOSCOPY) (N/A)      Hospital Course:   Admitted to hospital medicine for management of acute pancreatitis. [facility] consulted and removed pancreatic duct stent on 3/4. Pt reports significant improvement in symptoms after procedure. Clear liquid diet started and advanced as tolerated. Discharged to home.        Goals of Care Treatment Preferences:  Code Status: Full Code    /74  "(Patient Position: Lying)   Pulse 68   Temp 96.9 °F (36.1 °C) (Oral)   Resp 18   Ht 5' 2" (1.575 m)   Wt 122.5 kg (270 lb)   LMP 02/10/2022   SpO2 98%   Breastfeeding No   BMI 49.38 kg/m²     Physical Exam  Constitutional:       Appearance: She is obese. She is not toxic-appearing.   HENT:      Head: Normocephalic and atraumatic.      Nose: Nose normal.   Eyes:      Extraocular Movements: Extraocular movements intact.      Conjunctiva/sclera: Conjunctivae normal.   Cardiovascular:      Rate and Rhythm: Normal rate and regular rhythm.      Heart sounds: Normal heart sounds. No murmur heard.  Pulmonary:      Effort: Pulmonary effort is normal. No respiratory distress.      Breath sounds: Normal breath sounds. No rhonchi.   Abdominal:      General: Bowel sounds are normal. There is no distension.      Palpations: Abdomen is soft.      Tenderness: There is abdominal tenderness (epigastric, improving). There is no guarding.   Musculoskeletal:      Cervical back: Normal range of motion.      Right lower leg: No edema.      Left lower leg: No edema.   Skin:     General: Skin is warm and dry.   Neurological:      General: No focal deficit present.      Mental Status: She is alert and oriented to person, place, and time.   Psychiatric:         Mood and Affect: Mood normal.         Behavior: Behavior normal.     Consults:   Consults (From admission, onward)          Status Ordering Provider     Inpatient consult to [facility]  Once        Provider:  (Not yet assigned)    REG Lees            No new Assessment & Plan notes have been filed under this hospital service since the last note was generated.  Service: Hospital Medicine    Final Active Diagnoses:    Diagnosis Date Noted POA    PRINCIPAL PROBLEM:  Acute pancreatitis [K85.90] 01/28/2022 Yes    GERD (gastroesophageal reflux disease) [K21.9] 03/04/2022 Unknown    Irritable bowel syndrome with diarrhea [K58.0] 03/04/2022 " Unknown    Hypothyroidism [E03.9] 01/28/2022 Yes      Problems Resolved During this Admission:       Discharged Condition: good    Disposition: Home or Self Care    Follow Up:    Patient Instructions:      Diet Adult Regular     Notify your health care provider if you experience any of the following:  increased confusion or weakness     Notify your health care provider if you experience any of the following:  persistent dizziness, light-headedness, or visual disturbances     Notify your health care provider if you experience any of the following:  worsening rash     Notify your health care provider if you experience any of the following:  severe persistent headache     Notify your health care provider if you experience any of the following:  difficulty breathing or increased cough     Notify your health care provider if you experience any of the following:  redness, tenderness, or signs of infection (pain, swelling, redness, odor or green/yellow discharge around incision site)     Notify your health care provider if you experience any of the following:  severe uncontrolled pain     Notify your health care provider if you experience any of the following:  persistent nausea and vomiting or diarrhea     Notify your health care provider if you experience any of the following:  temperature >100.4     Activity as tolerated       Significant Diagnostic Studies: Labs:   CMP   Recent Labs   Lab 03/04/22  0740 03/05/22  0358    137   K 4.3 4.3    102   CO2 22* 26    86   BUN 11 5*   CREATININE 0.6 0.7   CALCIUM 9.7 9.6   PROT 7.6 6.8   ALBUMIN 3.7 3.3*   BILITOT 0.7 0.8   ALKPHOS 136* 123   AST 17 14   ALT 23 17   ANIONGAP 13 9   ESTGFRAFRICA >60.0 >60.0   EGFRNONAA >60.0 >60.0    and CBC   Recent Labs   Lab 03/04/22  0740 03/05/22  0358   WBC 13.50* 11.15   HGB 12.3 11.4*   HCT 38.8 36.3*    269       Pending Diagnostic Studies:       None           Medications:  Reconciled Home Medications:       Medication List        START taking these medications      oxyCODONE 5 MG immediate release tablet  Commonly known as: ROXICODONE  Take 1 tablet (5 mg total) by mouth every 6 (six) hours as needed for Pain.            CONTINUE taking these medications      cholecalciferol (vitamin D3) 50 mcg (2,000 unit) Cap  Commonly known as: VITAMIN D3  Take 3 capsules by mouth once daily.     COENZYME Q10 ORAL  Take 1 tablet by mouth once daily.     dicyclomine 20 mg tablet  Commonly known as: BENTYL  Take 1 tablet (20 mg total) by mouth 2 (two) times daily.     ferrous sulfate 325 (65 FE) MG EC tablet  Take 325 mg by mouth once daily.     fish oil-omega-3 fatty acids 300-1,000 mg capsule  Take by mouth once daily.     magnesium oxide 400 mg magnesium Cap  Take 1 capsule by mouth once daily.     NP THYROID 30 mg Tab  Generic drug: thyroid (pork)  Take 30 mg by mouth once daily.     omeprazole 40 MG capsule  Commonly known as: PRILOSEC  Take 40 mg by mouth as needed.     PRENATAL VITAMIN ORAL  Take 1 tablet by mouth once daily.     PROBIOTIC 20 billion cell Cap  Generic drug: lactobacillus comb no.10  Take 1 capsule by mouth once daily.     progesterone 200 MG capsule  Commonly known as: PROMETRIUM  Take 300 mg by mouth.     rifAXIMin 550 mg Tab  Commonly known as: XIFAXAN  Take 1 tablet (550 mg total) by mouth 3 (three) times daily.     TURMERIC ORAL  Take by mouth once daily.     UNABLE TO FIND  1 tablet once daily. Coenzyme B6, P-5-P     UNABLE TO FIND  2,000 mg once daily. eric-inositol     UNABLE TO FIND  HCG injections            STOP taking these medications      HYDROcodone-acetaminophen  mg per tablet  Commonly known as: NORCO     ketorolac 10 mg tablet  Commonly known as: TORADOL              Indwelling Lines/Drains at time of discharge:   Lines/Drains/Airways       None                   Time spent on the discharge of patient: 35 minutes         Aliyah Tavarez MD  Department of Hospital Medicine  [facility] -  Surgery

## 2022-03-05 NOTE — PROGRESS NOTES
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 She was recently admitted at [facility] 1/28-2/1 for post-ERCP pancreatitis and was feeling better after discharge. She reports that she began having pain across her upper abdomen two days ago. The pain is constant with waves of more severe pain. She reports that the pain worsens with eating and drinking. She has tried toradol without relief. She reports nausea/vomiting that began while in the ED. She denies fevers/chills. She reports recent alcohol use of 7 beers on [place]ardi [place]as but states that she otherwise drinks socially.    In the ED, T98.8, /99, HR 92, SpO2 99%. Labs notable for WBC 13.5, lipase 987. CT abdomen/pelvis showed pancreatic stent in place with mild peripancreatic fat stranding similar to CT 01/28/2022 and suggestive of ongoing acute pancreatitis. Admitted to hospital medicine for further management.       Overview/Hospital Course:  No notes on file    Interval History: NAEO. Pancreatic stent removed yesterday by [facility]. Pt reports significant improvement in pain after procedure. Minimal residual epigastric pain relieved by prns. She tolerated the clear liquid diet and would like to advance her diet this morning. She denies nausea/vomiting.    Review of Systems   Constitutional:  Positive for appetite change. Negative for chills and fever.   HENT:  Negative for sore throat and trouble swallowing.    Eyes:  Negative for visual disturbance.   Respiratory:  Negative for cough  and shortness of breath.    Cardiovascular:  Negative for chest pain and leg swelling.   Gastrointestinal:  Positive for abdominal pain (improving). Negative for constipation, diarrhea, nausea and vomiting.   Genitourinary:  Negative for difficulty urinating.   Musculoskeletal:  Negative for gait problem.   Neurological:  Negative for dizziness and weakness.   Psychiatric/Behavioral:  Negative for agitation and confusion.    Objective:     Vital Signs (Most Recent):  Temp: 96.4 °F (35.8 °C) (03/05/22 1135)  Pulse: 70 (03/05/22 1135)  Resp: 18 (03/05/22 1135)  BP: (!) 139/91 (03/05/22 1135)  SpO2: 98 % (03/05/22 1135)   Vital Signs (24h Range):  Temp:  [96.3 °F (35.7 °C)-99 °F (37.2 °C)] 96.4 °F (35.8 °C)  Pulse:  [64-92] 70  Resp:  [6-24] 18  SpO2:  [94 %-100 %] 98 %  BP: (108-140)/(61-91) 139/91     Weight: 122.5 kg (270 lb)  Body mass index is 49.38 kg/m².    Intake/Output Summary (Last 24 hours) at 3/5/2022 1312  Last data filed at 3/5/2022 0600  Gross per 24 hour   Intake 440 ml   Output --   Net 440 ml      Physical Exam  Constitutional:       Appearance: She is obese. She is not toxic-appearing.   HENT:      Head: Normocephalic and atraumatic.      Nose: Nose normal.   Eyes:      Extraocular Movements: Extraocular movements intact.      Conjunctiva/sclera: Conjunctivae normal.   Cardiovascular:      Rate and Rhythm: Normal rate and regular rhythm.      Heart sounds: Normal heart sounds. No murmur heard.  Pulmonary:      Effort: Pulmonary effort is normal. No respiratory distress.      Breath sounds: Normal breath sounds. No rhonchi.   Abdominal:      General: Bowel sounds are normal. There is no distension.      Palpations: Abdomen is soft.      Tenderness: There is abdominal tenderness (epigastric, improving). There is no guarding.   Musculoskeletal:      Cervical back: Normal range of motion.      Right lower leg: No edema.      Left lower leg: No edema.   Skin:     General: Skin is warm and dry.    Neurological:      General: No focal deficit present.      Mental Status: She is alert and oriented to person, place, and time.   Psychiatric:         Mood and Affect: Mood normal.         Behavior: Behavior normal.       Significant Labs: All pertinent labs within the past 24 hours have been reviewed.    Significant Imaging: I have reviewed all pertinent imaging results/findings within the past 24 hours.      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 Stent removal had been planned for 3/18. Afebrile and hemodynamically stable. Lipase 987. CT abdomen pelvis shows acute pancreatitis.   - oxycodone 5 mg q4h for moderate pain  - dilaudid 1 mg IV q6h for breakthrough pain  - prn zofran for nausea  - check triglycerides; wnl   - [facility] consult, appreciate recs    - s/p pancreatic stent removed 3/4  - clear liquid diet advanced to full liquid diet today, advance as tolerated   - continue IVF with  mL/hr     Irritable bowel syndrome with diarrhea  Follows with Dr. Avilez. Recently started on trial of rifaximin.  - continue rifaximin    GERD (gastroesophageal reflux disease)  Takes omeprazole as needed at home.     Hypothyroidism  - continue np thyroid 30 mg       VTE Risk Mitigation (From admission, onward)         Ordered     enoxaparin injection 40 mg  Every 12 hours         03/04/22 1139     IP VTE HIGH RISK PATIENT  Once         03/04/22 1139     Place sequential compression device  Until discontinued         03/04/22 1139                Discharge Planning   ALBERTINA: 3/6/2022     Code Status: Full Code   Is the patient medically ready for discharge?:     Reason for patient still in hospital (select all that apply): Patient trending condition                     Aliyah Tavarez MD  Department of Hospital  Medicine   [facility]

## 2022-03-07 ENCOUNTER — PATIENT OUTREACH (OUTPATIENT)
Dept: ADMINISTRATIVE | Facility: CLINIC | Age: 32
End: 2022-03-07
Payer: COMMERCIAL

## 2022-03-10 ENCOUNTER — TELEPHONE (OUTPATIENT)
Dept: ENDOSCOPY | Facility: HOSPITAL | Age: 32
End: 2022-03-10
Payer: COMMERCIAL

## 2022-03-10 NOTE — TELEPHONE ENCOUNTER
Telephoned patient to confirm appointment for ERCP on 3/18/2022 at 8:00am.  Spoke with pt.  She stated she had procedure done already after being admitted on 3/4/22.  Was told to follow-up in a year by Dr. Velazquez.  Removing her from the schedule at this time.

## 2023-01-02 DIAGNOSIS — K31.7 GASTRIC POLYP: Primary | ICD-10-CM

## 2023-04-30 ENCOUNTER — TELEPHONE (OUTPATIENT)
Dept: ENDOSCOPY | Facility: HOSPITAL | Age: 33
End: 2023-04-30

## 2023-06-12 ENCOUNTER — TELEPHONE (OUTPATIENT)
Dept: ENDOSCOPY | Facility: HOSPITAL | Age: 33
End: 2023-06-12
Payer: COMMERCIAL

## 2023-07-17 ENCOUNTER — TELEPHONE (OUTPATIENT)
Dept: ENDOSCOPY | Facility: HOSPITAL | Age: 33
End: 2023-07-17
Payer: COMMERCIAL

## 2023-07-17 NOTE — TELEPHONE ENCOUNTER
Spoke to patient to schedule procedure(s) Upper Endoscopy (EGD)       Physician to perform procedure(s) Dr. CRISTHIAN Velazquez  Date of Procedure (s) 8/30/23  Arrival Time 7:15 AM  Time of Procedure(s) 8:15 AM   Location of Procedure(s) [facility]  Type of Rx Prep sent to patient: Other  Instructions provided to patient via [facility]    Patient was informed on the following information and verbalized understanding. Screening questionnaire reviewed with patient and complete. If procedure requires anesthesia, a responsible adult needs to be present to accompany the patient home, patient cannot drive after receiving anesthesia. Appointment details are tentative, especially check-in time. Patient will receive a prep-op call 4 days prior to confirm check-in time for procedure. If applicable the patient should contact their pharmacy to verify Rx for procedure prep is ready for pick-up. Patient was advised to call the scheduling department at [phone] if pharmacy states no Rx is available. Patient was advised to call the endoscopy scheduling department if any questions or concerns arise.      [facility] Scheduling Department

## 2023-08-30 ENCOUNTER — ANESTHESIA (OUTPATIENT)
Dept: ENDOSCOPY | Facility: HOSPITAL | Age: 33
End: 2023-08-30
Payer: COMMERCIAL

## 2023-08-30 ENCOUNTER — ANESTHESIA EVENT (OUTPATIENT)
Dept: ENDOSCOPY | Facility: HOSPITAL | Age: 33
End: 2023-08-30
Payer: COMMERCIAL

## 2023-08-30 ENCOUNTER — HOSPITAL ENCOUNTER (OUTPATIENT)
Facility: HOSPITAL | Age: 33
Discharge: HOME | End: 2023-08-30
Attending: INTERNAL MEDICINE | Admitting: INTERNAL MEDICINE
Payer: COMMERCIAL

## 2023-08-30 VITALS
TEMPERATURE: 98 F | SYSTOLIC BLOOD PRESSURE: 122 MMHG | OXYGEN SATURATION: 97 % | DIASTOLIC BLOOD PRESSURE: 79 MMHG | BODY MASS INDEX: 52.44 KG/M2 | HEIGHT: 62 IN | RESPIRATION RATE: 20 BRPM | WEIGHT: 285 LBS | HEART RATE: 64 BPM

## 2023-08-30 DIAGNOSIS — D13.5 AMPULLARY ADENOMA: ICD-10-CM

## 2023-08-30 LAB
B-HCG UR QL: NEGATIVE
CTP QC/QA: YES

## 2023-08-30 PROCEDURE — 37000008 HC ANESTHESIA 1ST 15 MINUTES: Performed by: INTERNAL MEDICINE

## 2023-08-30 PROCEDURE — D9220A PRA ANESTHESIA: ICD-10-PCS | Mod: ANES,,, | Performed by: ANESTHESIOLOGY

## 2023-08-30 PROCEDURE — 88305 TISSUE EXAM BY PATHOLOGIST: CPT | Mod: 26,,, | Performed by: PATHOLOGY

## 2023-08-30 PROCEDURE — 43251 EGD REMOVE LESION SNARE: CPT | Performed by: INTERNAL MEDICINE

## 2023-08-30 PROCEDURE — 88305 TISSUE EXAM BY PATHOLOGIST: ICD-10-PCS | Mod: 26,,, | Performed by: PATHOLOGY

## 2023-08-30 PROCEDURE — 37000009 HC ANESTHESIA EA ADD 15 MINS: Performed by: INTERNAL MEDICINE

## 2023-08-30 PROCEDURE — 27201089 HC SNARE, DISP (ANY): Performed by: INTERNAL MEDICINE

## 2023-08-30 PROCEDURE — 88305 TISSUE EXAM BY PATHOLOGIST: CPT | Performed by: PATHOLOGY

## 2023-08-30 PROCEDURE — D9220A PRA ANESTHESIA: ICD-10-PCS | Mod: CRNA,,, | Performed by: NURSE ANESTHETIST, CERTIFIED REGISTERED

## 2023-08-30 PROCEDURE — 25000003 PHARM REV CODE 250: Performed by: NURSE ANESTHETIST, CERTIFIED REGISTERED

## 2023-08-30 PROCEDURE — 81025 URINE PREGNANCY TEST: CPT | Performed by: ANESTHESIOLOGY

## 2023-08-30 PROCEDURE — D9220A PRA ANESTHESIA: Mod: ANES,,, | Performed by: ANESTHESIOLOGY

## 2023-08-30 PROCEDURE — 43251 PR EGD, FLEX, W/REMOVAL, TUMOR/POLYP/LESION(S), SNARE: ICD-10-PCS | Mod: ,,, | Performed by: INTERNAL MEDICINE

## 2023-08-30 PROCEDURE — 43251 EGD REMOVE LESION SNARE: CPT | Mod: ,,, | Performed by: INTERNAL MEDICINE

## 2023-08-30 PROCEDURE — 27201012 HC FORCEPS, HOT/COLD, DISP: Performed by: INTERNAL MEDICINE

## 2023-08-30 PROCEDURE — 63600175 PHARM REV CODE 636 W HCPCS: Performed by: NURSE ANESTHETIST, CERTIFIED REGISTERED

## 2023-08-30 PROCEDURE — D9220A PRA ANESTHESIA: Mod: CRNA,,, | Performed by: NURSE ANESTHETIST, CERTIFIED REGISTERED

## 2023-08-30 RX ORDER — SODIUM CHLORIDE 0.9 % (FLUSH) 0.9 %
10 SYRINGE (ML) INJECTION
Status: DISCONTINUED | OUTPATIENT
Start: 2023-08-30 | End: 2023-08-30 | Stop reason: HOSPADM

## 2023-08-30 RX ORDER — PROPOFOL 10 MG/ML
VIAL (ML) INTRAVENOUS
Status: DISCONTINUED | OUTPATIENT
Start: 2023-08-30 | End: 2023-08-30

## 2023-08-30 RX ORDER — LIDOCAINE HYDROCHLORIDE 20 MG/ML
INJECTION INTRAVENOUS
Status: DISCONTINUED | OUTPATIENT
Start: 2023-08-30 | End: 2023-08-30

## 2023-08-30 RX ORDER — ONDANSETRON 2 MG/ML
INJECTION INTRAMUSCULAR; INTRAVENOUS
Status: DISCONTINUED | OUTPATIENT
Start: 2023-08-30 | End: 2023-08-30

## 2023-08-30 RX ORDER — MIDAZOLAM HYDROCHLORIDE 1 MG/ML
INJECTION, SOLUTION INTRAMUSCULAR; INTRAVENOUS
Status: DISCONTINUED | OUTPATIENT
Start: 2023-08-30 | End: 2023-08-30

## 2023-08-30 RX ORDER — PROPOFOL 10 MG/ML
INJECTION, EMULSION INTRAVENOUS CONTINUOUS PRN
Status: DISCONTINUED | OUTPATIENT
Start: 2023-08-30 | End: 2023-08-30

## 2023-08-30 RX ADMIN — ONDANSETRON 4 MG: 2 INJECTION INTRAMUSCULAR; INTRAVENOUS at 08:08

## 2023-08-30 RX ADMIN — GLYCOPYRROLATE 0.2 MG: 0.2 INJECTION, SOLUTION INTRAMUSCULAR; INTRAVENOUS at 08:08

## 2023-08-30 RX ADMIN — PROPOFOL 150 MCG/KG/MIN: 10 INJECTION, EMULSION INTRAVENOUS at 08:08

## 2023-08-30 RX ADMIN — MIDAZOLAM HYDROCHLORIDE 2 MG: 1 INJECTION, SOLUTION INTRAMUSCULAR; INTRAVENOUS at 08:08

## 2023-08-30 RX ADMIN — SODIUM CHLORIDE: 0.9 INJECTION, SOLUTION INTRAVENOUS at 08:08

## 2023-08-30 RX ADMIN — PROPOFOL 150 MG: 10 INJECTION, EMULSION INTRAVENOUS at 08:08

## 2023-08-30 RX ADMIN — LIDOCAINE HYDROCHLORIDE 100 MG: 20 INJECTION INTRAVENOUS at 08:08

## 2023-08-30 NOTE — ANESTHESIA PREPROCEDURE EVALUATION
08/30/2023  Heidi Zhang Caro is a 32 y.o., female s/p pancreatitis 2022 here to ensure stents removed.  Past Medical History:   Diagnosis Date    Acute pancreatitis     Ectopic pregnancy     Endometriosis     GERD (gastroesophageal reflux disease)     H/O chlamydia infection     Hiatal hernia 07/2021    IBS (irritable bowel syndrome)     Insulin resistance     Kidney stone     PCOS (polycystic ovarian syndrome)     PONV (postoperative nausea and vomiting)      Past Surgical History:   Procedure Laterality Date    COLONOSCOPY N/A 12/29/2021    Procedure: COLONOSCOPY Patient had a positive covid test on 12/6/22. She will bring positive results with her to the hospital.;  Surgeon: Davi Baldwin MD;  Location: [facility];  Service: Endoscopy;  Laterality: N/A;    CYSTOSCOPY W/ RETROGRADES Left 7/1/2021    Procedure: CYSTOSCOPY, WITH RETROGRADE PYELOGRAM;  Surgeon: Sam Griffiths MD;  Location: [facility] OR;  Service: Urology;  Laterality: Left;    DIAGNOSTIC LAPAROSCOPY      DILATION AND CURETTAGE OF UTERUS      ENDOSCOPIC ULTRASOUND OF UPPER GASTROINTESTINAL TRACT N/A 1/27/2022    Procedure: ULTRASOUND, UPPER GI TRACT, ENDOSCOPIC;  Surgeon: Jim Velazquez MD;  Location: [facility] ([address] [place] [address]);  Service: Endoscopy;  Laterality: N/A;  covid-1/24/22-[facility]  instructions sent via portal-BB    ERCP N/A 1/27/2022    Procedure: ERCP (ENDOSCOPIC RETROGRADE CHOLANGIOPANCREATOGRAPHY);  Surgeon: Jim Velazquez MD;  [facility]  Service: Endoscopy;  Laterality: N/A;  covid-1/24/22-[facility]  instructions sent via portal-BB    ESOPHAGOGASTRODUODENOSCOPY N/A 12/29/2021    Procedure: EGD (ESOPHAGOGASTRODUODENOSCOPY);  Surgeon: Davi Baldwin MD;  Location: [facility];  Service: Endoscopy;  Laterality: N/A;    ESOPHAGOGASTRODUODENOSCOPY N/A 3/4/2022    Procedure: EGD  (ESOPHAGOGASTRODUODENOSCOPY);  Surgeon: Jim Velazquez MD;  Location: [facility] ([address] [place] [address]);  Service: Endoscopy;  Laterality: N/A;    EXTRACORPOREAL SHOCK WAVE LITHOTRIPSY Left 7/1/2021    Procedure: LITHOTRIPSY, ESWL Attempted;  Surgeon: Sam Griffiths MD;  Location: [facility] OR;  Service: Urology;  Laterality: Left;    INTERNAL URETHROTOMY N/A 7/1/2021    Procedure: URETHROTOMY, INTERNAL;  Surgeon: Sam Griffiths MD;  Location: [facility] OR;  Service: Urology;  Laterality: N/A;    LASER LITHOTRIPSY Left 7/1/2021    Procedure: LITHOTRIPSY, USING LASER;  Surgeon: Sam Griffiths MD;  Location: [facility] OR;  Service: Urology;  Laterality: Left;    polyp removed      stent to Pancreas      TONSILLECTOMY      URETEROSCOPY Left 7/1/2021    Procedure: URETEROSCOPY;  Surgeon: Sam Griffiths MD;  Location: [facility] OR;  Service: Urology;  Laterality: Left;    WISDOM TOOTH EXTRACTION           Pre-op Assessment    I have reviewed the Patient Summary Reports.    I have reviewed the NPO Status.   I have reviewed the Medications.     Review of Systems  Anesthesia Hx:  No problems with previous Anesthesia  History of prior surgery of interest to airway management or planning: Denies Family Hx of Anesthesia complications.   Denies Personal Hx of Anesthesia complications.   Social:  Non-Smoker    Cardiovascular:  Cardiovascular Normal Exercise tolerance: good     Pulmonary:  Pulmonary Normal    Renal/:   renal calculi    Hepatic/GI:   Hiatal Hernia, GERD, well controlled    Endocrine:   Hypothyroidism  Morbid Obesity / BMI > 40      Physical Exam  General: Well nourished    Airway:  Mallampati: II / II  Mouth Opening: Normal  TM Distance: Normal  Tongue: Normal  Neck ROM: Normal ROM    Dental:  Intact    Chest/Lungs:  Normal Respiratory Rate    Heart:  Rate: Normal        Anesthesia Plan  Type of Anesthesia, risks & benefits discussed:    Anesthesia Type: Gen Natural Airway  Intra-op Monitoring Plan: Standard ASA  Monitors  Induction:  IV  Informed Consent: Informed consent signed with the Patient and all parties understand the risks and agree with anesthesia plan.  All questions answered.   ASA Score: 3  Day of Surgery Review of History & Physical: H&P Update referred to the surgeon/provider.    Ready For Surgery From Anesthesia Perspective.     .

## 2023-08-30 NOTE — ANESTHESIA RELEASE NOTE
"Anesthesia Release from PACU Note    Patient: Heidi Zhang Caro    Procedure(s) Performed: Procedure(s) (LRB):  EGD (ESOPHAGOGASTRODUODENOSCOPY) (N/A)    Anesthesia type: general    Post pain: Adequate analgesia    Post assessment: no apparent anesthetic complications and tolerated procedure well    Last Vitals:   Visit Vitals  /79 (BP Location: Right forearm, Patient Position: Lying)   Pulse 64   Temp 36.7 °C (98.1 °F) (Temporal)   Resp 20   Ht 5' 2" (1.575 m)   Wt 129.3 kg (285 lb)   SpO2 97%   Breastfeeding No   BMI 52.13 kg/m²       Post vital signs: stable    Level of consciousness: awake and alert     Nausea/Vomiting: no nausea/no vomiting    Complications: none    Airway Patency: patent    Respiratory: unassisted, spontaneous ventilation, room air    Cardiovascular: stable and blood pressure at baseline    Hydration: euvolemic  "

## 2023-08-30 NOTE — H&P
Short Stay Endoscopy History and Physical    PCP - Jad Walker MD  Referring Physician - Jim Velazquez MD  [address]  [address]  [place] [address]    Procedure - egd  ASA - per anesthesia  Mallampati - per anesthesia  History of Anesthesia problems - no  Family history Anesthesia problems -  no   Plan of anesthesia - General    HPI:  This is a 32 y.o. female here for evaluation of: duodenal poylp    Reflux - no  Dysphagia - no  Abdominal pain - no  Diarrhea - no    ROS:  Constitutional: No fevers, chills, No weight loss  CV: No chest pain  Pulm: No cough, No shortness of breath  Ophtho: No vision changes  GI: see HPI  Derm: No rash    Medical History:  has a past medical history of Acute pancreatitis, Ectopic pregnancy, Endometriosis, GERD (gastroesophageal reflux disease), H/O chlamydia infection, Hiatal hernia (07/2021), IBS (irritable bowel syndrome), Insulin resistance, Kidney stone, PCOS (polycystic ovarian syndrome), and PONV (postoperative nausea and vomiting).    Surgical History:  has a past surgical history that includes Tonsillectomy; Ucon tooth extraction; Diagnostic laparoscopy; Dilation and curettage of uterus; Cystoscopy w/ retrogrades (Left, 7/1/2021); Ureteroscopy (Left, 7/1/2021); Extracorporeal shock wave lithotripsy (Left, 7/1/2021); Internal urethrotomy (N/A, 7/1/2021); Laser lithotripsy (Left, 7/1/2021); Colonoscopy (N/A, 12/29/2021); Esophagogastroduodenoscopy (N/A, 12/29/2021); Endoscopic ultrasound of upper gastrointestinal tract (N/A, 1/27/2022); ERCP (N/A, 1/27/2022); polyp removed; stent to Pancreas; and Esophagogastroduodenoscopy (N/A, 3/4/2022).    Family History: family history includes Breast cancer in her paternal aunt; Cancer in her paternal grandmother; Diabetes in her father, paternal grandfather, and paternal uncle; Heart disease in her maternal grandfather; Hypertension in her mother..    Social History:  reports that she has never smoked. She has never  used smokeless tobacco. She reports current alcohol use. She reports that she does not use drugs.    Review of patient's allergies indicates:  No Known Allergies    Medications:   Medications Prior to Admission   Medication Sig Dispense Refill Last Dose    cholecalciferol, vitamin D3, (VITAMIN D3) 50 mcg (2,000 unit) Cap Take 3 capsules by mouth once daily.    8/29/2023    magnesium oxide 400 mg magnesium Cap Take 1 capsule by mouth once daily.   8/29/2023    NP THYROID 30 mg Tab Take 30 mg by mouth once daily.   8/29/2023    omega-3 fatty acids/fish oil (FISH OIL-OMEGA-3 FATTY ACIDS) 300-1,000 mg capsule Take by mouth once daily.   8/29/2023    prenatal vit calc,iron,folic (PRENATAL VITAMIN ORAL) Take 1 tablet by mouth once daily.   8/29/2023    progesterone (PROMETRIUM) 200 MG capsule Take 300 mg by mouth.   Past Month    TURMERIC ORAL Take by mouth once daily.   8/29/2023    ferrous sulfate 325 (65 FE) MG EC tablet Take 325 mg by mouth once daily.   More than a month    lactobacillus comb no.10 (PROBIOTIC) 20 billion cell Cap Take 1 capsule by mouth once daily.   More than a month    omeprazole (PRILOSEC) 40 MG capsule Take 40 mg by mouth as needed.   More than a month    rifAXIMin (XIFAXAN) 550 mg Tab Take 1 tablet (550 mg total) by mouth 3 (three) times daily. 42 tablet 2 More than a month    ubidecarenone (COENZYME Q10 ORAL) Take 1 tablet by mouth once daily.   More than a month    UNABLE TO FIND 1 tablet once daily. Coenzyme B6, P-5-P       UNABLE TO FIND 2,000 mg once daily. eric-inositol       UNABLE TO FIND HCG injections          Physical Exam:    Vital Signs:   Vitals:    08/30/23 0759   BP: (!) 153/87   Pulse: 83   Resp: 16   Temp: 98.1 °F (36.7 °C)       General Appearance: Well appearing in no acute distress    Labs:  Lab Results   Component Value Date    WBC 11.15 03/05/2022    HGB 11.4 (L) 03/05/2022    HCT 36.3 (L) 03/05/2022     03/05/2022    CHOL 138 01/28/2022    TRIG 66 03/04/2022    HDL  40 01/28/2022    ALT 17 03/05/2022    AST 14 03/05/2022     03/05/2022    K 4.3 03/05/2022     03/05/2022    CREATININE 0.7 03/05/2022    BUN 5 (L) 03/05/2022    CO2 26 03/05/2022    TSH 1.55 01/28/2022    HGBA1C 5.3 09/04/2014       I have explained the risks and benefits of this endoscopic procedure to the patient including but not limited to bleeding, inflammation, infection, perforation, and death.      Jim Velazquez MD

## 2023-08-30 NOTE — TRANSFER OF CARE
Anesthesia Transfer of Care Note    Patient: Heidi Zhang Caro    Procedure(s) Performed: Procedure(s) (LRB):  EGD (ESOPHAGOGASTRODUODENOSCOPY) (N/A)    Patient location: [facility]    Anesthesia Type: general    Transport from OR: Transported from OR on room air with adequate spontaneous ventilation    Post pain: adequate analgesia    Post assessment: no apparent anesthetic complications    Post vital signs: stable    Level of consciousness: responds to stimulation and awake    Nausea/Vomiting: no nausea/vomiting    Complications: none    Transfer of care protocol was followed      Last vitals:   Visit Vitals  /51   Pulse 80   Temp 98.1   Resp 23   Ht    Wt    SpO2 98%   Breastfeeding    BMI

## 2023-08-30 NOTE — PROVATION PATIENT INSTRUCTIONS
Discharge Summary/Instructions after an Endoscopic Procedure  Patient Name: Heidi Haynes  Patient MRN: [id number]  Patient YOB: 1990     Wednesday, August 30, 2023  Jim Velazquez MD  Dear patient,  As a result of recent federal legislation (The Federal Cures Act), you may   receive lab or pathology results from your procedure in your [facility]   account before your physician is able to contact you. Your physician or   their representative will relay the results to you with their   recommendations at their soonest availability.  Thank you,  RESTRICTIONS:  During your procedure today, you received medications for sedation.  These   medications may affect your judgment, balance and coordination.  Therefore,   for 24 hours, you have the following restrictions:   - DO NOT drive a car, operate machinery, make legal/financial decisions,   sign important papers or drink alcohol.    ACTIVITY:  Today: no heavy lifting, straining or running due to procedural   sedation/anesthesia.  The following day: return to full activity including work.  DIET:  Eat and drink normally unless instructed otherwise.     TREATMENT FOR COMMON SIDE EFFECTS:  - Mild abdominal pain, nausea, belching, bloating or excessive gas:  rest,   eat lightly and use a heating pad.  - Sore Throat: treat with throat lozenges and/or gargle with warm salt   water.  - Because air was used during the procedure, expelling large amounts of air   from your rectum or belching is normal.  - If a bowel prep was taken, you may not have a bowel movement for 1-3 days.    This is normal.  SYMPTOMS TO WATCH FOR AND REPORT TO YOUR PHYSICIAN:  1. Abdominal pain or bloating, other than gas cramps.  2. Chest pain.  3. Back pain.  4. Signs of infection such as: chills or fever occurring within 24 hours   after the procedure.  5. Rectal bleeding, which would show as bright red, maroon, or black stools.   (A tablespoon of blood from the rectum is not serious, especially if    hemorrhoids are present.)  6. Vomiting.  7. Weakness or dizziness.  GO DIRECTLY TO THE NEAREST EMERGENCY ROOM IF YOU HAVE ANY OF THE FOLLOWING:      Difficulty breathing              Chills and/or fever over 101 F   Persistent vomiting and/or vomiting blood   Severe abdominal pain   Severe chest pain   Black, tarry stools   Bleeding- more than one tablespoon   Any other symptom or condition that you feel may need urgent attention  Your doctor recommends these additional instructions:  If any biopsies were taken, your doctors clinic will contact you in 1 to 2   weeks with any results.  - Discharge patient to home.   - Resume previous diet.   - Continue present medications.   - Await pathology results.   - Repeat upper endoscopy in 1 year for surveillance.   - Return to primary care physician as previously scheduled.  For questions, problems or results please call your physician - Jim Velazquez MD at Work:  [phone].  [facility] EMERGENCY ROOM PHONE NUMBER: [phone]  IF A COMPLICATION OR EMERGENCY SITUATION ARISES AND YOU ARE UNABLE TO REACH   YOUR PHYSICIAN - GO DIRECTLY TO THE EMERGENCY ROOM.  Jim Velazquez MD  8/30/2023 8:31:08 AM  This report has been verified and signed electronically.  Dear patient,  As a result of recent federal legislation (The Federal Cures Act), you may   receive lab or pathology results from your procedure in your [facility]   account before your physician is able to contact you. Your physician or   their representative will relay the results to you with their   recommendations at their soonest availability.  Thank you,  PROVATION

## 2023-08-30 NOTE — ANESTHESIA POSTPROCEDURE EVALUATION
Anesthesia Post Evaluation    Patient: Heidi Zhang Caro    Procedure(s) Performed: Procedure(s) (LRB):  EGD (ESOPHAGOGASTRODUODENOSCOPY) (N/A)    Final Anesthesia Type: general      Patient location during evaluation: [facility]  Patient participation: Yes- Able to Participate  Level of consciousness: awake and alert  Post-procedure vital signs: reviewed and stable  Pain management: adequate  Airway patency: patent    PONV status at discharge: No PONV  Anesthetic complications: no      Cardiovascular status: hemodynamically stable  Respiratory status: unassisted, spontaneous ventilation and room air  Hydration status: euvolemic  Follow-up not needed.          Vitals Value Taken Time   /79 08/30/23 0917   Temp 36.7 °C (98.1 °F) 08/30/23 0845   Pulse 71 08/30/23 0922   Resp 20 08/30/23 0915   SpO2 98 % 08/30/23 0922   Vitals shown include unvalidated device data.      No case tracking events are documented in the log.      Pain/Yoni Score: Yoni Score: 9 (8/30/2023  8:45 AM)

## 2023-09-05 LAB
FINAL PATHOLOGIC DIAGNOSIS: NORMAL
GROSS: NORMAL
Lab: NORMAL

## 2023-11-06 NOTE — ASSESSMENT & PLAN NOTE
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 Stent removal had been planned for 3/18. Afebrile and hemodynamically stable. Lipase 987. CT abdomen pelvis shows acute pancreatitis.   - oxycodone 5 mg q4h for moderate pain  - dilaudid 1 mg IV q6h for breakthrough pain  - prn zofran for nausea  - check triglycerides; wnl   - AES consult, appreciate recs    - s/p pancreatic stent removed 3/4  - clear liquid diet advanced to full liquid diet today, advance as tolerated   - continue IVF with  mL/hr    Chief Complaint   Patient presents with    Diabetes        Referring Provider  No ref. provider found     HPI   Zeina Reed is a 61 y.o. female had concerns including Diabetes.    T2DM.    Diabetes was diagnosed 2010.  Complications include neuropathy, retinopathy-injections.  Last ophtho exam was q6 months-[facility].  Current medications for diabetes include Mounjaro 7.5 mg weekly, Metformin 1000 mg QD, Novolog 12 units TID with meals, Tresiba 15 units BID, Farxiga 10 mg QD. Has not taken any insulin in the last week due to having hypos when taking it.  Previous meds: Metformin-quick release-GI issues, Jardiance-yeast.  She checks her blood sugar with Dexcom CGM.   Hypos: more often, mostly overnight    ACE/ARB:no, Statin: yes  Labs:  A1C:today in office  Lipid Panel:yes  STEPHANIE: yes    The following portions of the patient's history were reviewed and updated as appropriate: allergies, current medications, past family history, past medical history, past social history, past surgical history, and problem list.     Diet: she doesn't follow a specific diet.    Past Medical History:   Diagnosis Date    Acute exacerbation of CHF (congestive heart failure) 07/10/2021    Arthritis     COPD (chronic obstructive pulmonary disease)     Diabetes mellitus     Disease of thyroid gland     Dyslipidemia     Elevated cholesterol     GERD (gastroesophageal reflux disease)     History of transfusion     Hyperlipidemia     Hypertension     Palpitations     Sleep apnea      Past Surgical History:   Procedure Laterality Date    CHOLECYSTECTOMY      COLONOSCOPY N/A 2/21/2017    Procedure: COLONOSCOPY FOR SCREENING  CPTCODE:05041;  Surgeon: Socrates Price III, MD;  Location: [facility] OR;  Service:     COLONOSCOPY N/A 9/14/2023    Procedure: COLONOSCOPY;  Surgeon: Natacha Jo MD;  Location: [facility] OR;  Service: Gastroenterology;  Laterality: N/A;    HYSTERECTOMY      SKIN GRAFT      TUBAL ABDOMINAL LIGATION         Family History   Problem Relation Age of Onset    Cancer Mother     Heart attack Father     Cancer Father       Social History     Socioeconomic History    Marital status:    Tobacco Use    Smoking status: Never    Smokeless tobacco: Never   Vaping Use    Vaping Use: Never used   Substance and Sexual Activity    Alcohol use: No    Drug use: No    Sexual activity: Defer      Allergies   Allergen Reactions    Augmentin [Amoxicillin-Pot Clavulanate] Diarrhea    Metformin And Related Diarrhea     Immediate Release - tolerates ER      Current Outpatient Medications on File Prior to Visit   Medication Sig Dispense Refill    acetaminophen-codeine (TYLENOL #3) 300-30 MG per tablet Take 1 tablet by mouth Every 12 (Twelve) Hours as needed 60 tablet 0    aspirin 81 MG EC tablet Take 1 tablet by mouth Daily.      busPIRone (BUSPAR) 5 MG tablet Take 1 tablet by mouth 3 (Three) Times a Day. (Patient taking differently: Take 1 tablet by mouth 3 (Three) Times a Day As Needed.) 90 tablet 2    Continuous Blood Gluc Sensor (Dexcom G7 Sensor) misc Change sensor Every 10 (Ten) Days. 3 each 5    dapagliflozin Propanediol (Farxiga) 10 MG tablet Take 1 tablet (10 mg) by mouth daily 30 tablet 5    DULoxetine (CYMBALTA) 30 MG capsule Take 1 capsule by mouth Daily. 30 capsule 6    fluticasone (FLONASE) 50 MCG/ACT nasal spray Instill 2 sprays in each nostril daily. 16 g 5    Fluticasone-Salmeterol (ADVAIR/WIXELA) 250-50 MCG/ACT DISKUS Inhale 1 puff 2 (Two) Times a Day. Taking 1 time daily      furosemide (LASIX) 40 MG tablet Take 1 tablet by mouth 2 (Two) Times a Day. (Patient taking differently: Take 1 tablet by mouth Daily.) 180 tablet 0    gabapentin (NEURONTIN) 300 MG capsule Take 1 capsule (300 mg) by mouth 3 times per day 90 capsule 2    insulin aspart (NovoLOG FlexPen) 100 UNIT/ML solution pen-injector sc pen Inject 12 Units under the skin into the appropriate area as directed 3 (Three) Times a Day. 15 mL 2    insulin aspart  "(NovoLOG FlexPen) 100 UNIT/ML solution pen-injector sc pen Inject 12 Units under the skin as directed 3 times a day. 15 mL 2    Insulin Pen Needle (NovoFine Plus Pen Needle) 32G X 4 MM misc USE 1 TIME DAILY 90 each 2    Insulin Syringe-Needle U-100 (BD Insulin Syringe U/F) 30G X 1/2\" 0.5 ML misc Use to inject insulin under the skin four times a day as directed 120 each 8    lansoprazole (PREVACID) 30 MG capsule Take 1 capsule (30 mg) by mouth daily before a meal 90 capsule 2    lansoprazole (PREVACID) 30 MG capsule Take 1 capsule (30 mg) by mouth daily before a meal 90 capsule 2    lansoprazole (PREVACID) 30 MG capsule Take 1 capsule (30 mg) by mouth daily before a meal 90 capsule 2    levothyroxine (Synthroid) 50 MCG tablet Take 1 tablet (50 mcg) by mouth daily in the morning on an empty stomach 90 tablet 1    loratadine (Claritin) 10 MG tablet Take 1 tablet (10 mg) by mouth daily 90 tablet 3    loratadine (Claritin) 10 MG tablet Take 1 tablet (10 mg) by mouth daily 90 tablet 3    metFORMIN ER (GLUCOPHAGE-XR) 500 MG 24 hr tablet Take 2 tablets by mouth Daily With Breakfast. 180 tablet 2    methocarbamol (ROBAXIN) 500 MG tablet Take 1 tablet by mouth two times daily as needed 60 tablet 3    metoprolol succinate XL (TOPROL-XL) 200 MG 24 hr tablet Take 1 tablet by mouth Daily. 90 tablet 0    ondansetron ODT (ZOFRAN-ODT) 8 MG disintegrating tablet Place 1 tablet on the tongue and allow to dissolve Every 8 (Eight) Hours As Needed. 30 tablet 3    potassium chloride ER (K-TAB) 20 MEQ tablet controlled-release ER tablet Take 1 tablet by mouth Daily. 60 tablet 3    potassium chloride ER (K-TAB) 20 MEQ tablet controlled-release ER tablet Take 2 tablets (40 mEq) by mouth daily with food 60 tablet 3    pravastatin (PRAVACHOL) 40 MG tablet Take 1 tablet by mouth Daily. 30 tablet 3    sacubitril-valsartan (ENTRESTO)  MG tablet Take 1 tablet by mouth 2 (Two) Times a Day. 60 tablet 3    spironolactone (ALDACTONE) 25 MG " "tablet Take 1 tablet (25 mg) by mouth daily 90 tablet 1    spironolactone (ALDACTONE) 25 MG tablet Take 1 tablet (25 mg) by mouth daily 90 tablet 1    spironolactone (ALDACTONE) 25 MG tablet Take 1 tablet by mouth Daily. 90 tablet 1    Tirzepatide (Mounjaro) 7.5 MG/0.5ML solution pen-injector Inject 7.5 mg subcutaneously weekly 2 mL 3    [DISCONTINUED] doxycycline (ADOXA) 100 MG tablet Take 1 tablet by mouth every 12 hours for 7 days 14 tablet 0    [DISCONTINUED] Fluticasone-Salmeterol (Advair Diskus) 250-50 MCG/ACT DISKUS Inhale 1 puff by mouth 2 (Two) Times a Day. (Patient taking differently: Inhale 1 puff 2 (Two) Times a Day. 5/19: pt thought she was only supposed to take once daily) 60 each 0    [DISCONTINUED] Insulin Pen Needle (Novofine Pen Needle) 32G X 6 MM misc Use 1 Daily as directed 100 each 0    [DISCONTINUED] Insulin Syringe-Needle U-100 (BD Insulin Syringe U/F) 30G X 1/2\" 0.5 ML misc Use to inject insulin four times a day. 500 each 0    [DISCONTINUED] loratadine (Claritin) 10 MG tablet Take 1 tablet (10 mg) by mouth daily 90 tablet 3    [DISCONTINUED] promethazine-dextromethorphan (PROMETHAZINE-DM) 6.25-15 MG/5ML syrup Take 5 mL by mouth every 6 hours as needed. 200 mL 0    [DISCONTINUED] Tirzepatide (Mounjaro) 5 MG/0.5ML solution pen-injector Inject 0.5 mL under the skin into the appropriate area as directed 1 (One) Time Per Week. (Patient not taking: Reported on 9/28/2023) 2 mL 2    [DISCONTINUED] Tirzepatide (Mounjaro) 5 MG/0.5ML solution pen-injector Inject 5 mg under the skin as directed once weekly on the same day of each week (Patient not taking: Reported on 9/28/2023) 2 mL 3     No current facility-administered medications on file prior to visit.        Review of Systems   Constitutional:  Positive for fatigue. Negative for unexpected weight gain and unexpected weight loss.   Eyes:  Positive for blurred vision and visual disturbance.   Endocrine: Positive for polydipsia, polyphagia and " "polyuria.   Psychiatric/Behavioral:  Positive for sleep disturbance.    All other systems reviewed and are negative.     /62 (BP Location: Left arm, Patient Position: Sitting, Cuff Size: Adult)   Pulse 77   Ht 160 cm (63\")   Wt 123 kg (271 lb 4.8 oz)   SpO2 95%   BMI 48.06 kg/m²      Physical Exam  Vitals reviewed.   Constitutional:       Appearance: Normal appearance.   Eyes:      Extraocular Movements: Extraocular movements intact.   Cardiovascular:      Rate and Rhythm: Normal rate.   Pulmonary:      Effort: Pulmonary effort is normal.   Skin:     General: Skin is warm and dry.   Neurological:      General: No focal deficit present.      Mental Status: She is alert and oriented to person, place, and time.   Psychiatric:         Mood and Affect: Mood normal.         Behavior: Behavior normal.         Thought Content: Thought content normal.         Judgment: Judgment normal.     CMP:  Lab Results   Component Value Date    BUN 16 09/22/2023    CREATININE 0.68 09/22/2023    EGFRIFNONA 71 12/17/2021    EGFRIFAFRI  09/17/2016      Comment:      <15 Indicative of kidney failure.    BCR 23.5 09/22/2023     09/22/2023    K 4.3 09/22/2023    CO2 27.4 09/22/2023    CALCIUM 9.6 09/22/2023    ALBUMIN 4.10 12/17/2021    LABIL2 1.4 (L) 02/12/2015    BILITOT 1.1 12/17/2021    ALKPHOS 120 (H) 12/17/2021    AST 16 12/17/2021    ALT 29 12/17/2021     Lipid Panel:  Lab Results   Component Value Date    CHOL 230 (H) 12/17/2021    TRIG 231 (H) 12/17/2021    HDL 56 12/17/2021    VLDL 41 (H) 12/17/2021     (H) 12/17/2021     HbA1c:  Lab Results   Component Value Date    HGBA1C 6.4 09/28/2023    HGBA1C 6.3 06/26/2023     Glucose:  Lab Results   Component Value Date    POCGLU 141 (A) 09/28/2023     Microalbumin:  No results found for: MALBCRERATIO  TSH:  Lab Results   Component Value Date    TSH 2.005 04/18/2017       Assessment and Plan    Diagnoses and all orders for this visit:    1. Type 2 diabetes mellitus " with hypoglycemia without coma, with long-term current use of insulin (Primary)  Assessment & Plan:  -Diabetes is at goal with A1c 6.4.  -Discussed dietary and exercise guidelines with patient.  -Discussed the importance of yearly eye exams.  -Discussed the importance of checking BG's regularly.  Continue using CGM.  2 week data download is as follows:  Very High: 20%  High: 52%  IN Range: 27%  Low: <1%  Very Low: 0%  Trend shows overall mild hypers with post meal hypers.  -Discussed medication and the need for adjustments as below.  -Continue Metformin XR 1000 mg QD.  -Continue Mounjaro 7.5 mg weekly. Patient has no personal history of pancreatitis, no family history of MEN syndrome or medullary thyroid cancer. Possible side effects including nausea, bloating, other GI upset and rarely pancreatitis were discussed. She was advised to call the office with any symptoms or concerns.   -Continue Farxiga 10 mg QD.  -Discontinue insulin use as this is causing increased hypos.  -S/S hypoglycemia reviewed with Rule of 15's advised.  -Follow-up in 3 months.      Orders:  -     POC Glycosylated Hemoglobin (Hb A1C)  -     POC Glucose, Blood         Return in about 3 months (around 12/28/2023) for Follow-up appointment, A1C. The patient was instructed to contact the clinic with any interval questions or concerns.        This document has been electronically signed by MAURO Viera  September 28, 2023 16:32 EDT   Endocrinology   Full Thickness Lip Wedge Repair (Flap) Text: Given the location of the defect and the proximity to free margins a full thickness wedge repair was deemed most appropriate. Using a sterile surgical marker, the appropriate repair was drawn incorporating the defect and placing the expected incisions perpendicular to the vermilion border.  The vermilion border was also meticulously outlined to ensure appropriate reapproximation during the repair.  The area thus outlined was incised through and through with a #15 scalpel blade.  The muscularis and dermis were reaproximated with deep sutures following hemostasis. Care was taken to realign the vermilion border before proceeding with the superficial closure.  Once the vermilion was realigned the superfical and mucosal closure was finished.

## 2024-08-09 ENCOUNTER — TELEPHONE (OUTPATIENT)
Dept: ENDOSCOPY | Facility: HOSPITAL | Age: 34
End: 2024-08-09
Payer: COMMERCIAL

## 2024-08-26 ENCOUNTER — TELEPHONE (OUTPATIENT)
Dept: ENDOSCOPY | Facility: HOSPITAL | Age: 34
End: 2024-08-26
Payer: COMMERCIAL

## 2024-08-26 NOTE — TELEPHONE ENCOUNTER
Multiple attempts made to call patient and schedule patient for and Upper EGD, no answer. Letter mailed for patient to contact the office to schedule procedure.

## 2024-09-03 ENCOUNTER — TELEPHONE (OUTPATIENT)
Dept: GASTROENTEROLOGY | Facility: CLINIC | Age: 34
End: 2024-09-03
Payer: COMMERCIAL

## 2024-09-03 NOTE — TELEPHONE ENCOUNTER
----- Message from Roberta Abel sent at 9/3/2024  3:27 PM CDT -----  Regarding: order  Contact: [phone]  Pt asking to have orders for her annual endoscopy placed. Pls call pt to let them know they are placed to get pt scheduled.

## 2024-09-04 ENCOUNTER — TELEPHONE (OUTPATIENT)
Dept: ENDOSCOPY | Facility: HOSPITAL | Age: 34
End: 2024-09-04
Payer: COMMERCIAL

## 2024-09-04 DIAGNOSIS — K31.7 GASTRIC POLYP: Primary | ICD-10-CM

## 2024-09-04 NOTE — TELEPHONE ENCOUNTER
Spoke to patient to schedule procedure(s) Upper Endoscopy (EGD)       Physician to perform procedure(s) Dr. CRISTHIAN Velazquez  Date of Procedure (s) 10/01/2024  Arrival Time 7:00 AM  Time of Procedure(s) 8:00 AM   Location of Procedure(s) [address]  Type of Rx Prep sent to patient: Other  Instructions provided to patient via [facility]    Patient was informed on the following information and verbalized understanding. Screening questionnaire reviewed with patient and complete. If procedure requires anesthesia, a responsible adult needs to be present to accompany the patient home, patient cannot drive after receiving anesthesia. Appointment details are tentative, especially check-in time. Patient will receive a prep-op call 7 days prior to confirm check-in time for procedure. If applicable the patient should contact their pharmacy to verify Rx for procedure prep is ready for pick-up. Patient was advised to call the scheduling department at [phone] if pharmacy states no Rx is available. Patient was advised to call the endoscopy scheduling department if any questions or concerns arise.      [facility] Scheduling Department

## 2024-09-24 ENCOUNTER — TELEPHONE (OUTPATIENT)
Dept: ENDOSCOPY | Facility: HOSPITAL | Age: 34
End: 2024-09-24
Payer: COMMERCIAL

## 2024-09-24 NOTE — TELEPHONE ENCOUNTER
.Spoke to patient for pre-call to confirm scheduled Upper Endoscopy (EGD) and patient verbalized understanding of the following:       Date of Procedure (s)  verified 10/1/24  Arrival Time 0700 verified.  Location of Procedure(s) [address] verified.  NPO status reinforced. Ok to continue clear liquids up until 4 hours prior to the Endoscopy procedure.   Confirmed Pt is currently taking Ozempic (Semaglutide), Pt instructed to stop stop taking Ozempic (Semaglutide) on 9/23/24.       Pt confirmed receipt of prep instructions and Rx prep (if applicable).  Instructions provided to patient via [facility]  Pt confirmed ride home after procedure if procedure requires anesthesia.   Pre-call screening questionnaire reviewed and completed with patient.   Appointment details are tentative, including check-in time.  If the patient begins taking any blood thinning medications, injectable weight loss/diabetes medications (other than insulin), or Adipex (phentermine) patient was instructed to contact the endoscopy scheduling department as soon as possible.  Patient was advised to call the endoscopy scheduling department if any questions or concerns arise.       [facility] Scheduling [facility]

## 2024-10-01 ENCOUNTER — ANESTHESIA (OUTPATIENT)
Dept: ENDOSCOPY | Facility: HOSPITAL | Age: 34
End: 2024-10-01
Payer: COMMERCIAL

## 2024-10-01 ENCOUNTER — ANESTHESIA EVENT (OUTPATIENT)
Dept: ENDOSCOPY | Facility: HOSPITAL | Age: 34
End: 2024-10-01
Payer: COMMERCIAL

## 2024-10-01 ENCOUNTER — HOSPITAL ENCOUNTER (OUTPATIENT)
Facility: HOSPITAL | Age: 34
Discharge: HOME | End: 2024-10-01
Attending: INTERNAL MEDICINE | Admitting: INTERNAL MEDICINE
Payer: COMMERCIAL

## 2024-10-01 VITALS
TEMPERATURE: 98 F | BODY MASS INDEX: 49.69 KG/M2 | RESPIRATION RATE: 18 BRPM | HEIGHT: 62 IN | WEIGHT: 270 LBS | HEART RATE: 69 BPM | OXYGEN SATURATION: 98 % | DIASTOLIC BLOOD PRESSURE: 74 MMHG | SYSTOLIC BLOOD PRESSURE: 125 MMHG

## 2024-10-01 DIAGNOSIS — D13.5 AMPULLARY ADENOMA: ICD-10-CM

## 2024-10-01 LAB
B-HCG UR QL: NEGATIVE
CTP QC/QA: YES

## 2024-10-01 PROCEDURE — 43251 EGD REMOVE LESION SNARE: CPT | Performed by: INTERNAL MEDICINE

## 2024-10-01 PROCEDURE — 88305 TISSUE EXAM BY PATHOLOGIST: CPT | Mod: 26,,, | Performed by: PATHOLOGY

## 2024-10-01 PROCEDURE — 63600175 PHARM REV CODE 636 W HCPCS

## 2024-10-01 PROCEDURE — 81025 URINE PREGNANCY TEST: CPT | Performed by: INTERNAL MEDICINE

## 2024-10-01 PROCEDURE — 37000009 HC ANESTHESIA EA ADD 15 MINS: Performed by: INTERNAL MEDICINE

## 2024-10-01 PROCEDURE — 27201089 HC SNARE, DISP (ANY): Performed by: INTERNAL MEDICINE

## 2024-10-01 PROCEDURE — 88305 TISSUE EXAM BY PATHOLOGIST: CPT | Performed by: PATHOLOGY

## 2024-10-01 PROCEDURE — 37000008 HC ANESTHESIA 1ST 15 MINUTES: Performed by: INTERNAL MEDICINE

## 2024-10-01 PROCEDURE — 25000003 PHARM REV CODE 250

## 2024-10-01 PROCEDURE — 43251 EGD REMOVE LESION SNARE: CPT | Mod: ,,, | Performed by: INTERNAL MEDICINE

## 2024-10-01 PROCEDURE — 25000003 PHARM REV CODE 250: Performed by: INTERNAL MEDICINE

## 2024-10-01 RX ORDER — LIDOCAINE HYDROCHLORIDE 20 MG/ML
INJECTION, SOLUTION EPIDURAL; INFILTRATION; INTRACAUDAL; PERINEURAL
Status: DISCONTINUED | OUTPATIENT
Start: 2024-10-01 | End: 2024-10-01

## 2024-10-01 RX ORDER — ONDANSETRON HYDROCHLORIDE 2 MG/ML
4 INJECTION, SOLUTION INTRAVENOUS DAILY PRN
Status: DISCONTINUED | OUTPATIENT
Start: 2024-10-01 | End: 2024-10-01 | Stop reason: HOSPADM

## 2024-10-01 RX ORDER — ONDANSETRON HYDROCHLORIDE 2 MG/ML
4 INJECTION, SOLUTION INTRAVENOUS ONCE AS NEEDED
Status: DISCONTINUED | OUTPATIENT
Start: 2024-10-01 | End: 2024-10-01 | Stop reason: HOSPADM

## 2024-10-01 RX ORDER — SODIUM CHLORIDE 0.9 % (FLUSH) 0.9 %
10 SYRINGE (ML) INJECTION
Status: DISCONTINUED | OUTPATIENT
Start: 2024-10-01 | End: 2024-10-01 | Stop reason: HOSPADM

## 2024-10-01 RX ORDER — SODIUM CHLORIDE 9 MG/ML
INJECTION, SOLUTION INTRAVENOUS CONTINUOUS
Status: DISCONTINUED | OUTPATIENT
Start: 2024-10-01 | End: 2024-10-01 | Stop reason: HOSPADM

## 2024-10-01 RX ORDER — ONDANSETRON HYDROCHLORIDE 2 MG/ML
4 INJECTION, SOLUTION INTRAVENOUS ONCE AS NEEDED
Status: DISCONTINUED | OUTPATIENT
Start: 2024-10-01 | End: 2024-10-01

## 2024-10-01 RX ORDER — INDOMETHACIN 50 MG/1
SUPPOSITORY RECTAL
Status: COMPLETED | OUTPATIENT
Start: 2024-10-01 | End: 2024-10-01

## 2024-10-01 RX ORDER — ONDANSETRON HYDROCHLORIDE 2 MG/ML
4 INJECTION, SOLUTION INTRAVENOUS DAILY PRN
Status: DISCONTINUED | OUTPATIENT
Start: 2024-10-01 | End: 2024-10-01

## 2024-10-01 RX ORDER — MIDAZOLAM HYDROCHLORIDE 1 MG/ML
INJECTION INTRAMUSCULAR; INTRAVENOUS
Status: DISCONTINUED | OUTPATIENT
Start: 2024-10-01 | End: 2024-10-01

## 2024-10-01 RX ORDER — ONDANSETRON HYDROCHLORIDE 2 MG/ML
INJECTION, SOLUTION INTRAVENOUS
Status: DISCONTINUED | OUTPATIENT
Start: 2024-10-01 | End: 2024-10-01

## 2024-10-01 RX ORDER — PROPOFOL 10 MG/ML
VIAL (ML) INTRAVENOUS CONTINUOUS PRN
Status: DISCONTINUED | OUTPATIENT
Start: 2024-10-01 | End: 2024-10-01

## 2024-10-01 RX ADMIN — LIDOCAINE HYDROCHLORIDE 100 MG: 20 INJECTION, SOLUTION EPIDURAL; INFILTRATION; INTRACAUDAL; PERINEURAL at 07:10

## 2024-10-01 RX ADMIN — PROPOFOL 100 MG: 10 INJECTION, EMULSION INTRAVENOUS at 07:10

## 2024-10-01 RX ADMIN — PROPOFOL 200 MCG/KG/MIN: 10 INJECTION, EMULSION INTRAVENOUS at 07:10

## 2024-10-01 RX ADMIN — ONDANSETRON 4 MG: 2 INJECTION INTRAMUSCULAR; INTRAVENOUS at 07:10

## 2024-10-01 RX ADMIN — MIDAZOLAM HYDROCHLORIDE 2 MG: 2 INJECTION, SOLUTION INTRAMUSCULAR; INTRAVENOUS at 07:10

## 2024-10-01 RX ADMIN — SODIUM CHLORIDE: 0.9 INJECTION, SOLUTION INTRAVENOUS at 07:10

## 2024-10-01 NOTE — H&P
Short Stay Endoscopy History and Physical    PCP - Jad Walker MD  Referring Physician - Jim Velazquez MD  [address] [facility]  [address] [facility]  [place] [address]    Procedure - egd  ASA - per anesthesia  Mallampati - per anesthesia  History of Anesthesia problems - no  Family history Anesthesia problems -  no   Plan of anesthesia - General    HPI:  This is a 34 y.o. 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    Surgical History:  has a past surgical history that includes Tonsillectomy; [place] tooth extraction; Diagnostic laparoscopy; Dilation and curettage of uterus; Cystoscopy w/ retrogrades (Left, 7/1/2021); Ureteroscopy (Left, 7/1/2021); Extracorporeal shock wave lithotripsy (Left, 7/1/2021); Internal urethrotomy (N/A, 7/1/2021); Laser lithotripsy (Left, 7/1/2021); Colonoscopy (N/A, 12/29/2021); Esophagogastroduodenoscopy (N/A, 12/29/2021); Endoscopic ultrasound of upper gastrointestinal tract (N/A, 1/27/2022); ERCP (N/A, 1/27/2022); polyp removed; stent to Pancreas; Esophagogastroduodenoscopy (N/A, 3/4/2022); and Esophagogastroduodenoscopy (N/A, 8/30/2023).    Family History: family history includes Breast cancer in her paternal aunt; Cancer in her paternal grandmother; Diabetes in her father, paternal grandfather, and paternal uncle; Heart disease in her maternal grandfather; Hypertension in her mother..    Social History:   reports that she has never smoked. She has never used smokeless tobacco. She reports current alcohol use. She reports that she does not use drugs.    Review of patient's allergies indicates:  No Known Allergies    Medications:   Medications Prior to Admission   Medication Sig Dispense Refill Last Dose/Taking    NP THYROID 30 mg Tab Take 30 mg by mouth once daily.   9/30/2024    progesterone (PROMETRIUM) 200 MG capsule Take 300 mg by mouth.   Past Week    semaglutide, weight loss, (WEGOVY) 0.25 mg/0.5 mL PnIj Inject 1 pen into the skin once weekly 2 mL 0 Past Month    TURMERIC ORAL Take by mouth once daily.   Past Month    ubidecarenone (COENZYME Q10 ORAL) Take 1 tablet by mouth once daily.   Past Month    cholecalciferol, vitamin D3, (VITAMIN D3) 50 mcg (2,000 unit) Cap Take 3 capsules by mouth once daily.        ferrous sulfate 325 (65 FE) MG EC tablet Take 325 mg by mouth once daily.   More than a month    lactobacillus comb no.10 (PROBIOTIC) 20 billion cell Cap Take 1 capsule by mouth once daily.   Unknown    magnesium oxide 400 mg magnesium Cap Take 1 capsule by mouth once daily.   More than a month    omega-3 fatty acids/fish oil (FISH OIL-OMEGA-3 FATTY ACIDS) 300-1,000 mg capsule Take by mouth once daily.       omeprazole (PRILOSEC) 40 MG capsule Take 40 mg by mouth as needed.   More than a month    prenatal vit calc,iron,folic (PRENATAL VITAMIN ORAL) Take 1 tablet by mouth once daily.   More than a month    rifAXIMin (XIFAXAN) 550 mg Tab Take 1 tablet (550 mg total) by mouth 3 (three) times daily. 42 tablet 2 More than a month    UNABLE TO FIND 1 tablet once daily. Coenzyme B6, P-5-P   More than a month    UNABLE TO FIND 2,000 mg once daily. eric-inositol   More than a month    UNABLE TO FIND HCG injections          Physical Exam:    Vital Signs:   Vitals:    10/01/24 0723   BP: 126/78   Pulse: 82   Resp: 16   Temp: 99.3 °F (37.4 °C)       General Appearance: Well appearing in no acute distress    Labs:  Lab  Results   Component Value Date    WBC 11.15 03/05/2022    HGB 11.4 (L) 03/05/2022    HCT 36.3 (L) 03/05/2022     03/05/2022    CHOL 138 01/28/2022    TRIG 66 03/04/2022    HDL 40 01/28/2022    ALT 17 03/05/2022    AST 14 03/05/2022     03/05/2022    K 4.3 03/05/2022     03/05/2022    CREATININE 0.7 03/05/2022    BUN 5 (L) 03/05/2022    CO2 26 03/05/2022    TSH 1.55 01/28/2022    HGBA1C 5.2 01/20/2021       I have explained the risks and benefits of this endoscopic procedure to the patient including but not limited to bleeding, inflammation, infection, perforation, and death.      Jim Velazquez MD

## 2024-10-01 NOTE — TRANSFER OF CARE
"Anesthesia Transfer of Care Note    Patient: Heidi Zhang Caro    Procedure(s) Performed: Procedure(s) (LRB):  EGD (ESOPHAGOGASTRODUODENOSCOPY) (N/A)    Patient location: [facility]    Anesthesia Type: general    Transport from OR: Transported from OR on 6-10 L/min O2 by face mask with adequate spontaneous ventilation    Post pain: adequate analgesia    Post assessment: no apparent anesthetic complications    Post vital signs: stable    Level of consciousness: awake    Nausea/Vomiting: no nausea/vomiting    Complications: none    Transfer of care protocol was followed      Last vitals: Visit Vitals  /78 (BP Location: Left arm, Patient Position: Lying)   Pulse 82   Temp 37.4 °C (99.3 °F)   Resp 16   Ht 5' 2" (1.575 m)   Wt 122.5 kg (270 lb)   SpO2 99%   Breastfeeding No   BMI 49.38 kg/m²     "

## 2024-10-01 NOTE — [employer] PATIENT INSTRUCTIONS
Discharge Summary/Instructions after an Endoscopic Procedure  Patient Name: Heidi Haynes  Patient MRN: [id number]  Patient YOB: 1990     Tuesday, October 1, 2024  Jim Velazquez MD  Dear patient,  As a result of recent federal legislation (The Federal Cures Act), you may   receive lab or pathology results from your procedure in your [facility]   account before your physician is able to contact you. Your physician or   their representative will relay the results to you with their   recommendations at their soonest availability.  Thank you,  RESTRICTIONS:  During your procedure today, you received medications for sedation.  These   medications may affect your judgment, balance and coordination.  Therefore,   for 24 hours, you have the following restrictions:   - DO NOT drive a car, operate machinery, make legal/financial decisions,   sign important papers or drink alcohol.    ACTIVITY:  Today: no heavy lifting, straining or running due to procedural   sedation/anesthesia.  The following day: return to full activity including work.  DIET:  Eat and drink normally unless instructed otherwise.     TREATMENT FOR COMMON SIDE EFFECTS:  - Mild abdominal pain, nausea, belching, bloating or excessive gas:  rest,   eat lightly and use a heating pad.  - Sore Throat: treat with throat lozenges and/or gargle with warm salt   water.  - Because air was used during the procedure, expelling large amounts of air   from your rectum or belching is normal.  - If a bowel prep was taken, you may not have a bowel movement for 1-3 days.    This is normal.  SYMPTOMS TO WATCH FOR AND REPORT TO YOUR PHYSICIAN:  1. Abdominal pain or bloating, other than gas cramps.  2. Chest pain.  3. Back pain.  4. Signs of infection such as: chills or fever occurring within 24 hours   after the procedure.  5. Rectal bleeding, which would show as bright red, maroon, or black stools.   (A tablespoon of blood from the rectum is not serious, especially if    hemorrhoids are present.)  6. Vomiting.  7. Weakness or dizziness.  GO DIRECTLY TO THE NEAREST EMERGENCY ROOM IF YOU HAVE ANY OF THE FOLLOWING:      Difficulty breathing              Chills and/or fever over 101 F   Persistent vomiting and/or vomiting blood   Severe abdominal pain   Severe chest pain   Black, tarry stools   Bleeding- more than one tablespoon   Any other symptom or condition that you feel may need urgent attention  Your doctor recommends these additional instructions:  If any biopsies were taken, your doctors clinic will contact you in 1 to 2   weeks with any results.  - Discharge patient to home.   - Resume previous diet.   - Await pathology results.   - Repeat upper endoscopy in 1 year for surveillance.  For questions, problems or results please call your physician - Jim Velazquez MD at Work:  [phone].  [facility], EMERGENCY ROOM PHONE NUMBER: [phone]  IF A COMPLICATION OR EMERGENCY SITUATION ARISES AND YOU ARE UNABLE TO REACH   YOUR PHYSICIAN - GO DIRECTLY TO THE EMERGENCY ROOM.  Jim Velazquez MD  10/1/2024 8:05:51 AM  This report has been verified and signed electronically.  Dear patient,  As a result of recent federal legislation (The Federal Cures Act), you may   receive lab or pathology results from your procedure in your [facility]   account before your physician is able to contact you. Your physician or   their representative will relay the results to you with their   recommendations at their soonest availability.  Thank you,  [employer]

## 2024-10-01 NOTE — ANESTHESIA PREPROCEDURE EVALUATION
10/01/2024  Heidi Zhang Caro is a 34 y.o., female.      Pre-op Assessment          Review of Systems  Anesthesia Hx:  No problems with previous Anesthesia                Cardiovascular:      Denies Hypertension.    Denies CAD.                                        Pulmonary:     Denies Asthma.     Denies Sleep Apnea.                Renal/:   Denies Chronic Renal Disease.                Hepatic/GI:   Denies PUD.  GERD Denies Liver Disease.            Neurological:    Denies CVA.    Denies Seizures.                                Endocrine:  Denies Diabetes. Hypothyroidism              Physical Exam  General: Alert    Airway:  Mallampati: I   Mouth Opening: Normal  TM Distance: Normal  Tongue: Normal  Neck ROM: Normal ROM    Dental:  Intact        Anesthesia Plan  Type of Anesthesia, risks & benefits discussed:    Anesthesia Type: Gen ETT  Intra-op Monitoring Plan: Standard ASA Monitors  Post Op Pain Control Plan: multimodal analgesia and IV/PO Opioids PRN  Induction:  IV  Airway Plan: Direct  Informed Consent: Informed consent signed with the Patient and all parties understand the risks and agree with anesthesia plan.  All questions answered.   ASA Score: 2    Ready For Surgery From Anesthesia Perspective.     .

## 2024-10-02 ENCOUNTER — PATIENT MESSAGE (OUTPATIENT)
Dept: GASTROENTEROLOGY | Facility: HOSPITAL | Age: 34
End: 2024-10-02
Payer: COMMERCIAL

## 2024-10-02 LAB
FINAL PATHOLOGIC DIAGNOSIS: NORMAL
GROSS: NORMAL
Lab: NORMAL

## 2024-10-02 NOTE — ANESTHESIA POSTPROCEDURE EVALUATION
Anesthesia Post Evaluation    Patient: Heidi Zhang Caro    Procedure(s) Performed: Procedure(s) (LRB):  EGD (ESOPHAGOGASTRODUODENOSCOPY) (N/A)    Final Anesthesia Type: general      Patient location during evaluation: GI PACU  Patient participation: Yes- Able to Participate  Level of consciousness: awake  Post-procedure vital signs: reviewed and stable  Pain management: adequate  Airway patency: patent    PONV status at discharge: No PONV  Anesthetic complications: no      Cardiovascular status: blood pressure returned to baseline  Respiratory status: unassisted  Hydration status: euvolemic  Follow-up not needed.              Vitals Value Taken Time   /74 10/01/24 0845   Temp 36.6 °C (97.9 °F) 10/01/24 0845   Pulse 69 10/01/24 0845   Resp 18 10/01/24 0845   SpO2 98 % 10/01/24 0845         No case tracking events are documented in the log.      Pain/Yoni Score: Yoni Score: 10 (10/1/2024  9:00 AM)
